# Patient Record
Sex: MALE | Race: BLACK OR AFRICAN AMERICAN | Employment: FULL TIME | URBAN - METROPOLITAN AREA
[De-identification: names, ages, dates, MRNs, and addresses within clinical notes are randomized per-mention and may not be internally consistent; named-entity substitution may affect disease eponyms.]

---

## 2018-06-07 ENCOUNTER — HOSPITAL ENCOUNTER (OUTPATIENT)
Dept: PHYSICAL THERAPY | Age: 53
Discharge: HOME | End: 2018-06-07
Payer: COMMERCIAL

## 2018-06-07 PROCEDURE — 97110 THERAPEUTIC EXERCISES: CPT

## 2018-06-07 PROCEDURE — 97162 PT EVAL MOD COMPLEX 30 MIN: CPT

## 2018-06-07 NOTE — PROGRESS NOTES
Ambulatory/Rehab Services H2 Model Falls Risk Assessment    Risk Factor Pts. ·   Confusion/Disorientation/Impulsivity  []    4 ·   Symptomatic Depression  []   2 ·   Altered Elimination  []   1 ·   Dizziness/Vertigo  []   1 ·   Gender (Male)  [x]   1 ·   Any administered antiepileptics (anticonvulsants):  []   2 ·   Any administered benzodiazepines:  []   1 ·   Visual Impairment (specify):  []   1 ·   Portable Oxygen Use  []   1 ·   Orthostatic ? BP  []   1 ·   History of Recent Falls (within 3 mos.)  []   5     Ability to Rise from Chair (choose one) Pts. ·   Ability to rise in a single movement  []   0 ·   Pushes up, successful in one attempt  []   1 ·   Multiple attempts, but successful  []   3 ·   Unable to rise without assistance  []   4   Total: (5 or greater = High Risk) 1     Falls Prevention Plan:   []                Physical Limitations to Exercise (specify):   []                Mobility Assistance Device (type):   []                Exercise/Equipment Adaptation (specify):    ©2010 [facility] [employer] [address] [place] [address] Patent #[id number].  Federal Law prohibits the replication, distribution or use without written permission from [facility] [employer]

## 2018-06-07 NOTE — THERAPY EVALUATION
Salvador Narvaez  : [id number] [facility],[address]  [address] [place] [address]  [phone]   [phone]        OUTPATIENT PHYSICAL THERAPY:Initial Assessment 2018         ICD-10: Treatment Diagnosis: Cervicalgia (M54.2)  Precautions/Allergies:   Codeine   Fall Risk Score: 1 (? 5 = High Risk)  MD Orders: eval and treat muscle spasms MEDICAL/REFERRING DIAGNOSIS:  Neck pain [M54.2]   DATE OF ONSET: 2 years ago  REFERRING PHYSICIAN: Simon Castrejon, NP  RETURN PHYSICIAN APPOINTMENT: unknown     INITIAL ASSESSMENT:  Mr. Ale Almazan presents with stiffness in the neck with over-recruitment of the cervical muscles causing pian, loss of motion, sleep disturbances. He is a good candidate for skilled PT to address these issues and allow pt to resume normal ADL's without pain limiting him. PROBLEM LIST (Impacting functional limitations):  1. Decreased ADL/Functional Activities  2. Increased Pain  3. Decreased Activity Tolerance  4. Decreased Flexibility/Joint Mobility  5. Decreased [place] with Home Exercise Program INTERVENTIONS PLANNED:  1. Home Exercise Program (HEP)  2. Manual Therapy  3. Therapeutic Exercise/Strengthening    TREATMENT PLAN:  Effective Dates: 2018 TO 2018 (60 days). Frequency/Duration: 2 times a week for 60 Days  GOALS: (Goals have been discussed and agreed upon with patient.)  Short-Term Functional Goals: Time Frame: 2 weeks  1. Pt to demonstrate correct sitting posture without cues. 2. Pt to report compliance with HEP. Discharge Goals: Time Frame: 8 weeks  1. Pt to demonstrate 70 degrees cervical rotation for pain free driving. 2. Pt to report pain < 2 for restorative sleep patterns. 3. Pt to demonstrate ability to elevate UE's without engaging UT muscles to perform ADL tasks without pain.   Rehabilitation Potential For Stated Goals: Good  Regarding Daniel Gamino's therapy, I certify that the treatment plan above will be carried out by a therapist or under their direction. Thank you for this referral,  Ria Rodriguez, PT       Referring Physician Signature: Arlet Joyner NP              Date                      HISTORY:   History of Present Injury/Illness (Reason for Referral):  Pt waking up with stiffness in the neck and low back for a couple of years. He has tried new mattresses and pillows with little success. He denies radiating sxs. He gets momentary relief in a hot shower. He has neck and back pain throughout the day at work and even at rest.  Past Medical History/Comorbidities:   Mr. Barton  has no past medical history on file. Mr. Barton  has no past surgical history on file. 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 Date Last Reviewed:  6/7/2018   # of Personal Factors/Comorbidities that affect the Plan of Care: 1-2: MODERATE COMPLEXITY   EXAMINATION:   Observation/Orthostatic Postural Assessment:          FH, rounded and FS;  Holds shoulders elevated  Palpation:          Marked tightness UT, levator, scalenes, scap region. ROM:     AROM cervical:  Rotation right 0-65, left 0-62  Significant limitation in SB bilaterally. Flex, ext WNL. UE WNL but overuse of UT with scap elevation. Strength:     Weak scap stabilizers. UE's 5/5. Special Tests:          unremarkable  Neurological Screen:        unremarkable  Functional Mobility:         Limited cervical mobility with faulty UE movement patterns. Body Structures Involved:  1. Joints  2. Muscles Body Functions Affected:  1. Sensory/Pain  2. Neuromusculoskeletal  3. Movement Related Activities and Participation Affected:  1. General Tasks and Demands  2. Mobility  3. Self Care  4. Domestic Life  5.  Community, Social and [place]   # of elements that affect the Plan of Care: 3: MODERATE COMPLEXITY   CLINICAL PRESENTATION:   Presentation: Evolving clinical presentation with changing clinical characteristics: MODERATE COMPLEXITY   CLINICAL DECISION MAKING:   Outcome Measure: Tool Used: Neck Disability Index (NDI)  Score:  Initial: 6/50  Most Recent: X/50 (Date: -- )   Interpretation of Score: The Neck Disability Index is a revised form of the Oswestry Low Back Pain Index and is designed to measure the activities of daily living in person's with neck pain. Each section is scored on a 0-5 scale, 5 representing the greatest disability. The scores of each section are added together for a total score of 50. Score 0 1-10 11-20 21-30 31-40 41-49 50   Modifier CH CI CJ CK CL CM CN         Medical Necessity:   · Patient demonstrates good rehab potential due to higher previous functional level. Reason for Services/Other Comments:  · Patient continues to require present interventions due to patient's inability to sleep, rama ADL's without pain. Use of outcome tool(s) and clinical judgement create a POC that gives a: Questionable prediction of patient's progress: MODERATE COMPLEXITY   TREATMENT:   (In addition to Assessment/Re-Assessment sessions the following treatments were rendered)  THERAPEUTIC EXERCISE: (see below for minutes):  Exercises per grid below to improve mobility and strength. Required moderate verbal and manual cues to promote proper body alignment, promote proper body posture, promote proper body mechanics and promote proper body breathing techniques. Progressed range, repetitions and complexity of movement as indicated. MANUAL THERAPY: (seee below for minutes): Soft tissue mobilization was utilized and necessary because of the patient's painful spasm and restricted motion of soft tissue.    MODALITIES: (see below for minutes):      for pain modulation     Date: 6/7/18       Modalities:                                Therapeutic Exercise: 28 mins UT stretch 3 mins       Levator stretch 3 mins       Chin tuck 3 mins       scap retractions 3 mins       Cervical rotation 3 mins       Diaphragmatic breathing 3 mins       Pt education, postural education, HEP 10 mins               Proprioceptive Activities:                                Manual Therapy:                        Therapeutic Activities:                                        HEP: see hand out  [employer] Portal  Treatment/Session Assessment:  Pt had good understanding of posture and need to control over-recruitment of UT/scap elevation. He was already applying concepts during session. · Pain/ Symptoms: Initial:   0/10 just stiffness this morning Post Session:  0/10 pt reported feeling much looser ·   Compliance with Program/Exercises: Will assess as treatment progresses. · Recommendations/Intent for next treatment session: \"Next visit will focus on advancements to more challenging activities\".   Total Treatment Duration:  PT Patient Time In/Time Out  Time In: 0800  Time Out: 0845     Mili Aguilar, PT

## 2018-06-14 ENCOUNTER — APPOINTMENT (OUTPATIENT)
Dept: PHYSICAL THERAPY | Age: 53
End: 2018-06-14
Payer: COMMERCIAL

## 2018-06-18 ENCOUNTER — APPOINTMENT (OUTPATIENT)
Dept: PHYSICAL THERAPY | Age: 53
End: 2018-06-18
Payer: COMMERCIAL

## 2018-06-20 ENCOUNTER — HOSPITAL ENCOUNTER (OUTPATIENT)
Dept: PHYSICAL THERAPY | Age: 53
Discharge: HOME | End: 2018-06-20
Payer: COMMERCIAL

## 2018-06-20 PROCEDURE — 97110 THERAPEUTIC EXERCISES: CPT

## 2018-06-20 PROCEDURE — 97140 MANUAL THERAPY 1/> REGIONS: CPT

## 2018-06-20 NOTE — PROGRESS NOTES
Eamon Ray  : 1965 [id number] [facility],[address]  [address] [place] [address].  [phone]   [phone]        OUTPATIENT PHYSICAL THERAPY:Daily Note 2018         ICD-10: Treatment Diagnosis: Cervicalgia (M54.2)  Precautions/Allergies:   Codeine   Fall Risk Score: 1 (? 5 = High Risk)  MD Orders: eval and treat muscle spasms MEDICAL/REFERRING DIAGNOSIS:  Neck pain [M54.2]   DATE OF ONSET: 2 years ago  REFERRING PHYSICIAN: Ladan Castrejon NP  RETURN PHYSICIAN APPOINTMENT: unknown     INITIAL ASSESSMENT:  Mr. La Nena Aguero presents with stiffness in the neck with over-recruitment of the cervical muscles causing pian, loss of motion, sleep disturbances. He is a good candidate for skilled PT to address these issues and allow pt to resume normal ADL's without pain limiting him. PROBLEM LIST (Impacting functional limitations):  1. Decreased ADL/Functional Activities  2. Increased Pain  3. Decreased Activity Tolerance  4. Decreased Flexibility/Joint Mobility  5. Decreased [place] with Home Exercise Program INTERVENTIONS PLANNED:  1. Home Exercise Program (HEP)  2. Manual Therapy  3. Therapeutic Exercise/Strengthening    TREATMENT PLAN:  Effective Dates: 2018 TO 2018 (60 days). Frequency/Duration: 2 times a week for 60 Days  GOALS: (Goals have been discussed and agreed upon with patient.)  Short-Term Functional Goals: Time Frame: 2 weeks  1. Pt to demonstrate correct sitting posture without cues. 2. Pt to report compliance with HEP. Discharge Goals: Time Frame: 8 weeks  1. Pt to demonstrate 70 degrees cervical rotation for pain free driving. 2. Pt to report pain < 2 for restorative sleep patterns. 3. Pt to demonstrate ability to elevate UE's without engaging UT muscles to perform ADL tasks without pain.                 HISTORY:   History of Present Injury/Illness (Reason for Referral):  Pt waking up with stiffness in the neck and low back for a couple of years. He has tried new mattresses and pillows with little success. He denies radiating sxs. He gets momentary relief in a hot shower. He has neck and back pain throughout the day at work and even at rest.  Past Medical History/Comorbidities:   Mr. Timoteo Ray  has no past medical history on file. Mr. Timoteo Ray  has no past surgical history on file. 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 Date Last Reviewed:  6/20/2018   EXAMINATION:   Observation/Orthostatic Postural Assessment:          FH, rounded and FS;  Holds shoulders elevated  Palpation:          Marked tightness UT, levator, scalenes, scap region. ROM:     AROM cervical: 6/20/18  Rotation right 0-72, left 0-62  Significant limitation in SB bilaterally. Flex, ext WNL. UE WNL but overuse of UT with scap elevation. Strength:     Weak scap stabilizers. UE's 5/5. Special Tests:          unremarkable  Neurological Screen:        unremarkable  Functional Mobility:         Limited cervical mobility with faulty UE movement patterns. Body Structures Involved:  1. Joints  2. Muscles Body Functions Affected:  1. Sensory/Pain  2. Neuromusculoskeletal  3. Movement Related Activities and Participation Affected:  1. General Tasks and Demands  2. Mobility  3. Self Care  4. Domestic Life  5. Community, Social and Civic Life   CLINICAL PRESENTATION:   CLINICAL DECISION MAKING:   Outcome Measure: Tool Used: Neck Disability Index (NDI)  Score:  Initial: 6/50  Most Recent: X/50 (Date: -- )   Interpretation of Score: The Neck Disability Index is a revised form of the Oswestry Low Back Pain Index and is designed to measure the activities of daily living in person's with neck pain. Each section is scored on a 0-5 scale, 5 representing the greatest disability. The scores of each section are added together for a total score of 50. Score 0 1-10 11-20 21-30 31-40 41-49 50   Modifier CH CI CJ CK CL CM CN         Medical Necessity:   · Patient demonstrates good rehab potential due to higher previous functional level. Reason for Services/Other Comments:  · Patient continues to require present interventions due to patient's inability to sleep, rama ADL's without pain. TREATMENT:   (In addition to Assessment/Re-Assessment sessions the following treatments were rendered)  THERAPEUTIC EXERCISE: (see below for minutes):  Exercises per grid below to improve mobility and strength. Required moderate verbal and manual cues to promote proper body alignment, promote proper body posture, promote proper body mechanics and promote proper body breathing techniques. Progressed range, repetitions and complexity of movement as indicated. MANUAL THERAPY: (seee below for minutes): Soft tissue mobilization was utilized and necessary because of the patient's painful spasm and restricted motion of soft tissue. MODALITIES: (see below for minutes):      for pain modulation     Date: 6/7/18 6/20/18  Visit 2      Modalities:                                Therapeutic Exercise: 28 mins 25 mins      UT stretch 3 mins 6 mins      Levator stretch 3 mins 6 mins      Chin tuck 3 mins 2 mins      scap retractions 3 mins 30 reps      Cervical rotation 3 mins 6 mins      Diaphragmatic breathing 3 mins       Pt education, postural education, HEP 10 mins 3 mins              Proprioceptive Activities:                                Manual Therapy:  20 mins      STM cervical  15 mins      Mobs for rotation/ext  5 mins      Therapeutic Activities:                                        HEP: see hand out  [employer] Portal  Treatment/Session Assessment: Good compliance. Improved mobility noted.      · Pain/ Symptoms: Initial:   1/10 just some stiffness but so much better than last time Post Session:  0/10 ·   Compliance with Program/Exercises: Will assess as treatment progresses. · Recommendations/Intent for next treatment session: \"Next visit will focus on advancements to more challenging activities\".   Total Treatment Duration:  PT Patient Time In/Time Out  Time In: 0800  Time Out: 0845     Jerry Aguilar, PT

## 2018-06-22 ENCOUNTER — HOSPITAL ENCOUNTER (OUTPATIENT)
Dept: PHYSICAL THERAPY | Age: 53
Discharge: HOME | End: 2018-06-22
Payer: COMMERCIAL

## 2018-06-22 PROCEDURE — 97140 MANUAL THERAPY 1/> REGIONS: CPT

## 2018-06-22 PROCEDURE — 97110 THERAPEUTIC EXERCISES: CPT

## 2018-06-22 NOTE — PROGRESS NOTES
Jomar Castelan  : 1965 [id number] [facility],[address]  [address] [place] [address].  [phone]   [phone]        OUTPATIENT PHYSICAL THERAPY:Daily Note 2018         ICD-10: Treatment Diagnosis: Cervicalgia (M54.2)  Precautions/Allergies:   Codeine   Fall Risk Score: 1 (? 5 = High Risk)  MD Orders: eval and treat muscle spasms MEDICAL/REFERRING DIAGNOSIS:  Neck pain [M54.2]   DATE OF ONSET: 2 years ago  REFERRING PHYSICIAN: Britt Castrejon, ROGERS  RETURN PHYSICIAN APPOINTMENT: unknown     INITIAL ASSESSMENT:  Mr. Russell Session presents with stiffness in the neck with over-recruitment of the cervical muscles causing pian, loss of motion, sleep disturbances. He is a good candidate for skilled PT to address these issues and allow pt to resume normal ADL's without pain limiting him. PROBLEM LIST (Impacting functional limitations):  1. Decreased ADL/Functional Activities  2. Increased Pain  3. Decreased Activity Tolerance  4. Decreased Flexibility/Joint Mobility  5. Decreased [place] with Home Exercise Program INTERVENTIONS PLANNED:  1. Home Exercise Program (HEP)  2. Manual Therapy  3. Therapeutic Exercise/Strengthening    TREATMENT PLAN:  Effective Dates: 2018 TO 2018 (60 days). Frequency/Duration: 2 times a week for 60 Days  GOALS: (Goals have been discussed and agreed upon with patient.)  Short-Term Functional Goals: Time Frame: 2 weeks  1. Pt to demonstrate correct sitting posture without cues. 2. Pt to report compliance with HEP. Discharge Goals: Time Frame: 8 weeks  1. Pt to demonstrate 70 degrees cervical rotation for pain free driving. 2. Pt to report pain < 2 for restorative sleep patterns. 3. Pt to demonstrate ability to elevate UE's without engaging UT muscles to perform ADL tasks without pain.                 HISTORY:   History of Present Injury/Illness (Reason for Referral):  Pt waking up with stiffness in the neck and low back for a couple of years. He has tried new mattresses and pillows with little success. He denies radiating sxs. He gets momentary relief in a hot shower. He has neck and back pain throughout the day at work and even at rest.  Past Medical History/Comorbidities:   Mr. Suresh Dickson  has no past medical history on file. Mr. Suresh Dickson  has no past surgical history on file. 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 Date Last Reviewed:  6/22/2018   EXAMINATION:   Observation/Orthostatic Postural Assessment:          FH, rounded and FS;  Holds shoulders elevated  Palpation:          Marked tightness UT, levator, scalenes, scap region. ROM:     AROM cervical: 6/22/18  Rotation right 0-72, left 0-55  Significant limitation in SB bilaterally. Flex, ext WNL. UE WNL but overuse of UT with scap elevation. Strength:     Weak scap stabilizers. UE's 5/5. Special Tests:          unremarkable  Neurological Screen:        unremarkable  Functional Mobility:         Limited cervical mobility with faulty UE movement patterns. Body Structures Involved:  1. Joints  2. Muscles Body Functions Affected:  1. Sensory/Pain  2. Neuromusculoskeletal  3. Movement Related Activities and Participation Affected:  1. General Tasks and Demands  2. Mobility  3. Self Care  4. Domestic Life  5. Community, Social and Civic Life   CLINICAL PRESENTATION:   CLINICAL DECISION MAKING:   Outcome Measure: Tool Used: Neck Disability Index (NDI)  Score:  Initial: 6/50  Most Recent: X/50 (Date: -- )   Interpretation of Score: The Neck Disability Index is a revised form of the Oswestry Low Back Pain Index and is designed to measure the activities of daily living in person's with neck pain. Each section is scored on a 0-5 scale, 5 representing the greatest disability. The scores of each section are added together for a total score of 50. Score 0 1-10 11-20 21-30 31-40 41-49 50   Modifier CH CI CJ CK CL CM CN         Medical Necessity:   · Patient demonstrates good rehab potential due to higher previous functional level. Reason for Services/Other Comments:  · Patient continues to require present interventions due to patient's inability to sleep, rama ADL's without pain. TREATMENT:   (In addition to Assessment/Re-Assessment sessions the following treatments were rendered)  THERAPEUTIC EXERCISE: (see below for minutes):  Exercises per grid below to improve mobility and strength. Required moderate verbal and manual cues to promote proper body alignment, promote proper body posture, promote proper body mechanics and promote proper body breathing techniques. Progressed range, repetitions and complexity of movement as indicated. MANUAL THERAPY: (seee below for minutes): Soft tissue mobilization was utilized and necessary because of the patient's painful spasm and restricted motion of soft tissue. MODALITIES: (see below for minutes):      for pain modulation     Date: 6/7/18 6/20/18  Visit 2 6/22/18  Visit 3     Modalities:                                Therapeutic Exercise: 28 mins 25 mins 25 mins     UT stretch 3 mins 6 mins 6 mins     Levator stretch 3 mins 6 mins 6 mins     Chin tuck 3 mins 2 mins 30 reps     scap retractions 3 mins 30 reps 30 reps     Cervical rotation 3 mins 6 mins 6 mins     Diaphragmatic breathing 3 mins       Pt education, postural education, HEP 10 mins 3 mins              Proprioceptive Activities:                                Manual Therapy:  20 mins 20 mins     STM cervical  15 mins 15 mins     Mobs for rotation/ext  5 mins 5 mins     Therapeutic Activities:                                        HEP: see hand out  [employer] Portal  Treatment/Session Assessment:     · Pain/ Symptoms: Initial:   0/10  I am feeling great in my neck.   No stiffness or pain. Now my back is really getting my attention. The nurse is supposed to send an order so you work on it. Post Session:  0/10 ·   Compliance with Program/Exercises: Will assess as treatment progresses. · Recommendations/Intent for next treatment session: \"Next visit will focus on advancements to more challenging activities\".   Total Treatment Duration:  PT Patient Time In/Time Out  Time In: 0415  Time Out: 0500     Anuel Aguilar, PT

## 2018-06-25 ENCOUNTER — APPOINTMENT (OUTPATIENT)
Dept: PHYSICAL THERAPY | Age: 53
End: 2018-06-25
Payer: COMMERCIAL

## 2018-06-27 ENCOUNTER — HOSPITAL ENCOUNTER (OUTPATIENT)
Dept: PHYSICAL THERAPY | Age: 53
Discharge: HOME | End: 2018-06-27
Payer: COMMERCIAL

## 2018-06-27 PROCEDURE — 97140 MANUAL THERAPY 1/> REGIONS: CPT

## 2018-06-27 PROCEDURE — 97110 THERAPEUTIC EXERCISES: CPT

## 2018-06-27 NOTE — THERAPY RECERTIFICATION
Pipo Schmidt  : 1965 [address] [facility],[address]  [address] [place] [address]  [phone]   [phone]        OUTPATIENT PHYSICAL THERAPY:Daily Note and Recertification          ICD-10: Treatment Diagnosis: Cervicalgia (M54.2), Low back pain (M54.5)  Precautions/Allergies:   Codeine   Fall Risk Score: 1 (? 5 = High Risk)  MD Orders: eval and treat muscle spasms MEDICAL/REFERRING DIAGNOSIS:  Neck pain [M54.2]   DATE OF ONSET: 2 years ago  REFERRING PHYSICIAN: Lalo Castrejon, ROGERS  RETURN PHYSICIAN APPOINTMENT: unknown     INITIAL ASSESSMENT:  Mr. Fernando Hernandez presents with stiffness in the neck with over-recruitment of the cervical muscles causing pian, loss of motion, sleep disturbances. He also reports stiffness in the low back that hinders overall function and sleep quality. He is a good candidate for skilled PT to address these issues and allow pt to resume normal ADL's without pain limiting him. PROBLEM LIST (Impacting functional limitations):  1. Decreased ADL/Functional Activities  2. Increased Pain  3. Decreased Activity Tolerance  4. Decreased Flexibility/Joint Mobility  5. Decreased [place] with Home Exercise Program INTERVENTIONS PLANNED:  1. Home Exercise Program (HEP)  2. Manual Therapy  3. Therapeutic Exercise/Strengthening    TREATMENT PLAN:  Effective Dates: 2018 TO 2018 (60 days). Frequency/Duration: 2 times a week for 60 Days  GOALS: (Goals have been discussed and agreed upon with patient.)  Short-Term Functional Goals: Time Frame: 2 weeks  1. Pt to demonstrate correct sitting posture without cues. - MET  2. Pt to report compliance with HEP. - MET  Discharge Goals: Time Frame: 8 weeks  1. Pt to demonstrate 70 degrees cervical rotation for pain free driving. - MET  2. Pt to report pain < 2 for restorative sleep patterns. - ongoing  3.  Pt to demonstrate ability to elevate UE's without engaging UT muscles to perform ADL tasks without pain. - ongoing    Regarding Ana Gamino's therapy, I certify that the treatment plan above will be carried out by a therapist or under their direction. Thank you for this referral,  Sandra Miller, PT     Referring Physician Signature: Edinson Christianson, NP          Date                          HISTORY:   History of Present Injury/Illness (Reason for Referral):  Pt waking up with stiffness in the neck and low back for a couple of years. He has tried new mattresses and pillows with little success. He denies radiating sxs. He gets momentary relief in a hot shower. He has neck and back pain throughout the day at work and even at rest.  Past Medical History/Comorbidities:   Mr. Slim Isaacs  has no past medical history on file. Mr. Slim Isaacs  has no past surgical history on file. 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 Date Last Reviewed:  6/27/2018   EXAMINATION:   Observation/Orthostatic Postural Assessment:          FH, rounded and FS;  symm pelvic landmarks, rigid thoracolumbar region  Palpation:          Marked tightness UT, levator, scalenes, scap region, paraspinals left > right. ROM:     AROM cervical: 6/27/18  Rotation right 0-77, left 0-70  SB, flex, ext WNL. Trunk range WNL but guarded through all ranges. Hams and piriformis tightness. Strength:     Weak scap stabilizers. UE's 5/5. 5/5 LE's but unable to engage gluts without paraspinals and hams. Special Tests:          unremarkable  Neurological Screen:        unremarkable  Functional Mobility:         Limited cervical mobility with faulty UE movement patterns. Overuse of paraspinals and hams with lifting, pushing, pulling with work tasks. Body Structures Involved:  1. Joints  2.  Muscles Body Functions Affected:  1. Sensory/Pain  2. Neuromusculoskeletal  3. Movement Related Activities and Participation Affected:  1. General Tasks and Demands  2. Mobility  3. Self Care  4. Domestic Life  5. Community, Social and Civic Life   CLINICAL PRESENTATION:   CLINICAL DECISION MAKING:   Outcome Measure: Tool Used: Neck Disability Index (NDI)  Score:  Initial: 6/50  Most Recent: X/50 (Date: -- )   Interpretation of Score: The Neck Disability Index is a revised form of the Oswestry Low Back Pain Index and is designed to measure the activities of daily living in person's with neck pain. Each section is scored on a 0-5 scale, 5 representing the greatest disability. The scores of each section are added together for a total score of 50. Score 0 1-10 11-20 21-30 31-40 41-49 50   Modifier CH CI CJ CK CL CM CN         Medical Necessity:   · Patient demonstrates good rehab potential due to higher previous functional level. Reason for Services/Other Comments:  · Patient continues to require present interventions due to patient's inability to sleep, rama ADL's without pain. TREATMENT:   (In addition to Assessment/Re-Assessment sessions the following treatments were rendered)  THERAPEUTIC EXERCISE: (see below for minutes):  Exercises per grid below to improve mobility and strength. Required moderate verbal and manual cues to promote proper body alignment, promote proper body posture, promote proper body mechanics and promote proper body breathing techniques. Progressed range, repetitions and complexity of movement as indicated. MANUAL THERAPY: (seee below for minutes): Soft tissue mobilization was utilized and necessary because of the patient's painful spasm and restricted motion of soft tissue.    MODALITIES: (see below for minutes):      for pain modulation     Date: 6/7/18 6/20/18  Visit 2 6/22/18  Visit 3 5/61/12  Visit 4  Re-cert for LBP       Modalities:                                            Therapeutic Exercise: 28 mins 25 mins 25 mins 30 mins       UT stretch 3 mins 6 mins 6 mins 4 mins       Levator stretch 3 mins 6 mins 6 mins 4 mins       Chin tuck 3 mins 2 mins 30 reps        scap retractions 3 mins 30 reps 30 reps        Cervical rotation 3 mins 6 mins 6 mins 4 mins       Diaphragmatic breathing 3 mins          Pt education, postural education, HEP 10 mins 3 mins  5 mins       LTR    3 mins       Hams stretching    4 mins       Piriformis stretching    5 mins seated, supine       Glut sets without paraspinals, hams    3 mins       Bridging without paraspinals, hams    3 mins                  Proprioceptive Activities:                                            Manual Therapy:  20 mins 20 mins 25 mins       STM cervical - lumbar  15 mins 15 mins 25 mins       Mobs for rotation/ext  5 mins 5 mins        Therapeutic Activities:                                                       HEP: see hand out  [employer] Portal  Treatment/Session Assessment: pt has made very good progress with cervical motion and pain control by learning to relax and utilizing better muscle recruitment patterns. He should do equally as well with the lumbar issues as he learns stretching, relaxing and improved recruitment with lifting mechanics for work. Left UE sxs relieved with relaxation of left UT.     · Pain/ Symptoms: Initial:   0/10  No pain just some stiffness in the left back more than the right. My left arm gets tingly at various times. Post Session:  0/10 ·   Compliance with Program/Exercises: Will assess as treatment progresses. · Recommendations/Intent for next treatment session: \"Next visit will focus on advancements to more challenging activities\".   Total Treatment Duration:  PT Patient Time In/Time Out  Time In: 0800  Time Out: 0900     Yue Aguilar, PT

## 2018-06-28 ENCOUNTER — APPOINTMENT (OUTPATIENT)
Dept: PHYSICAL THERAPY | Age: 53
End: 2018-06-28
Payer: COMMERCIAL

## 2018-06-29 ENCOUNTER — HOSPITAL ENCOUNTER (OUTPATIENT)
Dept: PHYSICAL THERAPY | Age: 53
Discharge: HOME | End: 2018-06-29
Payer: COMMERCIAL

## 2018-06-29 PROCEDURE — 97140 MANUAL THERAPY 1/> REGIONS: CPT

## 2018-06-29 PROCEDURE — 97110 THERAPEUTIC EXERCISES: CPT

## 2018-06-29 NOTE — PROGRESS NOTES
Lopez Aggarwal  : 1965 [id number] [facility],[address]  [address] [place] [address].  [phone]   [phone]        OUTPATIENT PHYSICAL THERAPY:Daily Note 2018         ICD-10: Treatment Diagnosis: Cervicalgia (M54.2), Low back pain (M54.5)  Precautions/Allergies:   Codeine   Fall Risk Score: 1 (? 5 = High Risk)  MD Orders: eval and treat muscle spasms MEDICAL/REFERRING DIAGNOSIS:  Neck pain [M54.2]   DATE OF ONSET: 2 years ago  REFERRING PHYSICIAN: Ewelina Castrejon, NP  RETURN PHYSICIAN APPOINTMENT: unknown     INITIAL ASSESSMENT:  Mr. Kwan Sethi presents with stiffness in the neck with over-recruitment of the cervical muscles causing pian, loss of motion, sleep disturbances. He also reports stiffness in the low back that hinders overall function and sleep quality. He is a good candidate for skilled PT to address these issues and allow pt to resume normal ADL's without pain limiting him. PROBLEM LIST (Impacting functional limitations):  1. Decreased ADL/Functional Activities  2. Increased Pain  3. Decreased Activity Tolerance  4. Decreased Flexibility/Joint Mobility  5. Decreased [place] with Home Exercise Program INTERVENTIONS PLANNED:  1. Home Exercise Program (HEP)  2. Manual Therapy  3. Therapeutic Exercise/Strengthening    TREATMENT PLAN:  Effective Dates: 2018 TO 2018 (60 days). Frequency/Duration: 2 times a week for 60 Days  GOALS: (Goals have been discussed and agreed upon with patient.)  Short-Term Functional Goals: Time Frame: 2 weeks  1. Pt to demonstrate correct sitting posture without cues. - MET  2. Pt to report compliance with HEP. - MET  Discharge Goals: Time Frame: 8 weeks  1. Pt to demonstrate 70 degrees cervical rotation for pain free driving. - MET  2. Pt to report pain < 2 for restorative sleep patterns. - ongoing  3. Pt to demonstrate ability to elevate UE's without engaging UT muscles to perform ADL tasks without pain.  - ongoing                HISTORY:   History of Present Injury/Illness (Reason for Referral):  Pt waking up with stiffness in the neck and low back for a couple of years. He has tried new mattresses and pillows with little success. He denies radiating sxs. He gets momentary relief in a hot shower. He has neck and back pain throughout the day at work and even at rest.  Past Medical History/Comorbidities:   Mr. Rudy Zelaya  has no past medical history on file. Mr. Rudy Zelaya  has no past surgical history on file. 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 Date Last Reviewed:  6/29/2018   EXAMINATION:   Observation/Orthostatic Postural Assessment:          FH, rounded and FS;  symm pelvic landmarks, rigid thoracolumbar region  Palpation:          Marked tightness UT, levator, scalenes, scap region, paraspinals left > right. ROM:     AROM cervical: 6/27/18  Rotation right 0-77, left 0-70  SB, flex, ext WNL. Trunk range WNL but guarded through all ranges. Hams and piriformis tightness. Strength:     Weak scap stabilizers. UE's 5/5. 5/5 LE's but unable to engage gluts without paraspinals and hams. Special Tests:          unremarkable  Neurological Screen:        unremarkable  Functional Mobility:         Limited cervical mobility with faulty UE movement patterns. Overuse of paraspinals and hams with lifting, pushing, pulling with work tasks. Body Structures Involved:  1. Joints  2. Muscles Body Functions Affected:  1. Sensory/Pain  2. Neuromusculoskeletal  3. Movement Related Activities and Participation Affected:  1. General Tasks and Demands  2. Mobility  3. Self Care  4. Domestic Life  5. Community, Social and Civic Life   CLINICAL PRESENTATION:   CLINICAL DECISION MAKING:   Outcome Measure:    Tool Used: Neck Disability Index (NDI)  Score:  Initial: 6/50  Most Recent: X/50 (Date: -- )   Interpretation of Score: The Neck Disability Index is a revised form of the Oswestry Low Back Pain Index and is designed to measure the activities of daily living in person's with neck pain. Each section is scored on a 0-5 scale, 5 representing the greatest disability. The scores of each section are added together for a total score of 50. Score 0 1-10 11-20 21-30 31-40 41-49 50   Modifier CH CI CJ CK CL CM CN         Medical Necessity:   · Patient demonstrates good rehab potential due to higher previous functional level. Reason for Services/Other Comments:  · Patient continues to require present interventions due to patient's inability to sleep, rama ADL's without pain. TREATMENT:   (In addition to Assessment/Re-Assessment sessions the following treatments were rendered)  THERAPEUTIC EXERCISE: (see below for minutes):  Exercises per grid below to improve mobility and strength. Required moderate verbal and manual cues to promote proper body alignment, promote proper body posture, promote proper body mechanics and promote proper body breathing techniques. Progressed range, repetitions and complexity of movement as indicated. MANUAL THERAPY: (seee below for minutes): Soft tissue mobilization was utilized and necessary because of the patient's painful spasm and restricted motion of soft tissue.    MODALITIES: (see below for minutes):      for pain modulation     Date: 6/7/18 6/20/18  Visit 2 6/22/18  Visit 3 8/12/95  Visit 4  Re-cert for LBP 5/94/02  Visit 5      Modalities:                                            Therapeutic Exercise: 28 mins 25 mins 25 mins 30 mins 35 mins      UT stretch 3 mins 6 mins 6 mins 4 mins 4 mins      Levator stretch 3 mins 6 mins 6 mins 4 mins 4 mins      Chin tuck 3 mins 2 mins 30 reps  30 reps      scap retractions 3 mins 30 reps 30 reps        Cervical rotation 3 mins 6 mins 6 mins 4 mins Diaphragmatic breathing 3 mins          Pt education, postural education, HEP 10 mins 3 mins  5 mins       LTR    3 mins 3 mins      Hams stretching    4 mins 4 mins      Piriformis stretching    5 mins seated, supine 6 mins seated      Glut sets without paraspinals, hams    3 mins       Bridging without paraspinals, hams    3 mins 30 reps      S/L hip abd     30 reps      Clam shells     30 reps                            Proprioceptive Activities:                                            Manual Therapy:  20 mins 20 mins 25 mins 10 mins      STM cervical - lumbar  15 mins 15 mins 25 mins 10 mins      Mobs for rotation/ext  5 mins 5 mins        Therapeutic Activities:                                                       HEP: see hand out  [employer] Portal  Treatment/Session Assessment: Responding well with less soreness and stiffness. · Pain/ Symptoms: Initial:   0/10 feeling less stiff in neck and back - it is very nice relief Post Session:  No pain ·   Compliance with Program/Exercises: Will assess as treatment progresses. · Recommendations/Intent for next treatment session: \"Next visit will focus on advancements to more challenging activities\".   Total Treatment Duration:  PT Patient Time In/Time Out  Time In: 1100  Time Out: Marlon Pinto PT

## 2018-07-02 ENCOUNTER — APPOINTMENT (OUTPATIENT)
Dept: PHYSICAL THERAPY | Age: 53
End: 2018-07-02
Payer: COMMERCIAL

## 2018-07-03 ENCOUNTER — HOSPITAL ENCOUNTER (OUTPATIENT)
Dept: PHYSICAL THERAPY | Age: 53
Discharge: HOME | End: 2018-07-03
Payer: COMMERCIAL

## 2018-07-03 PROCEDURE — 97110 THERAPEUTIC EXERCISES: CPT

## 2018-07-03 NOTE — PROGRESS NOTES
Luigi White  : 1965 [address] [facility],[address]  [address] [place] [address]  [phone]   [phone]        OUTPATIENT PHYSICAL THERAPY:Daily Note 7/3/2018         ICD-10: Treatment Diagnosis: Cervicalgia (M54.2), Low back pain (M54.5)  Precautions/Allergies:   Codeine   Fall Risk Score: 1 (? 5 = High Risk)  MD Orders: eval and treat muscle spasms MEDICAL/REFERRING DIAGNOSIS:  Neck pain [M54.2]   DATE OF ONSET: 2 years ago  REFERRING PHYSICIAN: Tova Castrejon, ROGERS  RETURN PHYSICIAN APPOINTMENT: unknown     INITIAL ASSESSMENT:  Mr. Anastasia Moreno presents with stiffness in the neck with over-recruitment of the cervical muscles causing pian, loss of motion, sleep disturbances. He also reports stiffness in the low back that hinders overall function and sleep quality. He is a good candidate for skilled PT to address these issues and allow pt to resume normal ADL's without pain limiting him. PROBLEM LIST (Impacting functional limitations):  1. Decreased ADL/Functional Activities  2. Increased Pain  3. Decreased Activity Tolerance  4. Decreased Flexibility/Joint Mobility  5. Decreased [place] with Home Exercise Program INTERVENTIONS PLANNED:  1. Home Exercise Program (HEP)  2. Manual Therapy  3. Therapeutic Exercise/Strengthening    TREATMENT PLAN:  Effective Dates: 2018 TO 2018 (60 days). Frequency/Duration: 2 times a week for 60 Days  GOALS: (Goals have been discussed and agreed upon with patient.)  Short-Term Functional Goals: Time Frame: 2 weeks  1. Pt to demonstrate correct sitting posture without cues. - MET  2. Pt to report compliance with HEP. - MET  Discharge Goals: Time Frame: 8 weeks  1. Pt to demonstrate 70 degrees cervical rotation for pain free driving. - MET  2. Pt to report pain < 2 for restorative sleep patterns. - ongoing  3. Pt to demonstrate ability to elevate UE's without engaging UT muscles to perform ADL tasks without pain.  - ongoing                HISTORY:   History of Present Injury/Illness (Reason for Referral):  Pt waking up with stiffness in the neck and low back for a couple of years. He has tried new mattresses and pillows with little success. He denies radiating sxs. He gets momentary relief in a hot shower. He has neck and back pain throughout the day at work and even at rest.  Past Medical History/Comorbidities:   Mr. Carri Rose  has no past medical history on file. Mr. Carri Rose  has no past surgical history on file. 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 Date Last Reviewed:  7/3/2018   EXAMINATION:   Observation/Orthostatic Postural Assessment:          FH, rounded and FS;  symm pelvic landmarks, rigid thoracolumbar region  Palpation:          Marked tightness UT, levator, scalenes, scap region, paraspinals left > right. ROM:     AROM cervical: 6/27/18  Rotation right 0-77, left 0-70  SB, flex, ext WNL. Trunk range WNL but guarded through all ranges. Hams and piriformis tightness. Strength:     Weak scap stabilizers. UE's 5/5. 5/5 LE's but unable to engage gluts without paraspinals and hams. Special Tests:          unremarkable  Neurological Screen:        unremarkable  Functional Mobility:         Limited cervical mobility with faulty UE movement patterns. Overuse of paraspinals and hams with lifting, pushing, pulling with work tasks. Body Structures Involved:  1. Joints  2. Muscles Body Functions Affected:  1. Sensory/Pain  2. Neuromusculoskeletal  3. Movement Related Activities and Participation Affected:  1. General Tasks and Demands  2. Mobility  3. Self Care  4. Domestic Life  5. Community, Social and Civic Life   CLINICAL PRESENTATION:   CLINICAL DECISION MAKING:   Outcome Measure:    Tool Used: Neck Disability Index (NDI)  Score:  Initial: 6/50  Most Recent: X/50 (Date: -- )   Interpretation of Score: The Neck Disability Index is a revised form of the Oswestry Low Back Pain Index and is designed to measure the activities of daily living in person's with neck pain. Each section is scored on a 0-5 scale, 5 representing the greatest disability. The scores of each section are added together for a total score of 50. Score 0 1-10 11-20 21-30 31-40 41-49 50   Modifier CH CI CJ CK CL CM CN         Medical Necessity:   · Patient demonstrates good rehab potential due to higher previous functional level. Reason for Services/Other Comments:  · Patient continues to require present interventions due to patient's inability to sleep, rama ADL's without pain. TREATMENT:   (In addition to Assessment/Re-Assessment sessions the following treatments were rendered)  THERAPEUTIC EXERCISE: (see below for minutes):  Exercises per grid below to improve mobility and strength. Required moderate verbal and manual cues to promote proper body alignment, promote proper body posture, promote proper body mechanics and promote proper body breathing techniques. Progressed range, repetitions and complexity of movement as indicated. MANUAL THERAPY: (seee below for minutes): Soft tissue mobilization was utilized and necessary because of the patient's painful spasm and restricted motion of soft tissue.    MODALITIES: (see below for minutes):      for pain modulation     Date: 6/7/18 6/20/18  Visit 2 6/22/18  Visit 3 6/90/86  Visit 4  Re-cert for LBP 5/46/95  Visit 5 7/3/18  Visit 6     Modalities:                                            Therapeutic Exercise: 28 mins 25 mins 25 mins 30 mins 35 mins 55 mins     UT stretch 3 mins 6 mins 6 mins 4 mins 4 mins 4 mins     Levator stretch 3 mins 6 mins 6 mins 4 mins 4 mins 4 mins     Chin tuck 3 mins 2 mins 30 reps  30 reps 30 reps     scap retractions 3 mins 30 reps 30 reps        Cervical rotation 3 mins 6 mins 6 mins 4 mins       Diaphragmatic breathing 3 mins          Pt education, postural education, HEP 10 mins 3 mins  5 mins       LTR    3 mins 3 mins      Hams stretching    4 mins 4 mins 6 mins supine     Piriformis stretching    5 mins seated, supine 6 mins seated 6 mins supine, seated     Glut sets without paraspinals, hams    3 mins       Bridging without paraspinals, hams    3 mins 30 reps 30 reps     S/L hip abd keeping back relaxed     30 reps 30 reps     Clam shells     30 reps 30 reps     H/k pelvic tilts      3 mins     H/k rocking back      3 mins                Proprioceptive Activities:                                            Manual Therapy:  20 mins 20 mins 25 mins 10 mins      STM cervical - lumbar  15 mins 15 mins 25 mins 10 mins      Mobs for rotation/ext  5 mins 5 mins        Therapeutic Activities:                                            HEP: see hand out  [employer] Portal  Treatment/Session Assessment:  Compliant with all suggestions, HEP. Left hip remains most restricted but good overall improvement in mobility, flexibility, relaxation and sxs relief. · Pain/ Symptoms: Initial:   No pain and not even really stiffness except in left side of back and hip Post Session:  No pain, good flexibility after stretching ·   Compliance with Program/Exercises: Will assess as treatment progresses. · Recommendations/Intent for next treatment session: \"Next visit will focus on advancements to more challenging activities\".   Total Treatment Duration:  PT Patient Time In/Time Out  Time In: 0800  Time Out: 0855     Jeanette Aguilar, PT

## 2018-07-05 ENCOUNTER — APPOINTMENT (OUTPATIENT)
Dept: PHYSICAL THERAPY | Age: 53
End: 2018-07-05
Payer: COMMERCIAL

## 2018-07-06 ENCOUNTER — APPOINTMENT (OUTPATIENT)
Dept: PHYSICAL THERAPY | Age: 53
End: 2018-07-06
Payer: COMMERCIAL

## 2018-07-06 ENCOUNTER — HOSPITAL ENCOUNTER (OUTPATIENT)
Dept: PHYSICAL THERAPY | Age: 53
Discharge: HOME | End: 2018-07-06
Payer: COMMERCIAL

## 2018-07-06 PROCEDURE — 97110 THERAPEUTIC EXERCISES: CPT

## 2018-07-06 NOTE — PROGRESS NOTES
Claudene Cristopher  : 1965 [id number] [facility],[address]  [address] [place] [address]  [phone]   [phone]        OUTPATIENT PHYSICAL THERAPY:Daily Note 2018         ICD-10: Treatment Diagnosis: Cervicalgia (M54.2), Low back pain (M54.5)  Precautions/Allergies:   Codeine   Fall Risk Score: 1 (? 5 = High Risk)  MD Orders: eval and treat muscle spasms MEDICAL/REFERRING DIAGNOSIS:  Neck pain [M54.2]   DATE OF ONSET: 2 years ago  REFERRING PHYSICIAN: Mary Ellen Castrejon, ROGERS  RETURN PHYSICIAN APPOINTMENT: unknown     INITIAL ASSESSMENT:  Mr. Kelli Eagle presents with stiffness in the neck with over-recruitment of the cervical muscles causing pian, loss of motion, sleep disturbances. He also reports stiffness in the low back that hinders overall function and sleep quality. He is a good candidate for skilled PT to address these issues and allow pt to resume normal ADL's without pain limiting him. PROBLEM LIST (Impacting functional limitations):  1. Decreased ADL/Functional Activities  2. Increased Pain  3. Decreased Activity Tolerance  4. Decreased Flexibility/Joint Mobility  5. Decreased [place] with Home Exercise Program INTERVENTIONS PLANNED:  1. Home Exercise Program (HEP)  2. Manual Therapy  3. Therapeutic Exercise/Strengthening    TREATMENT PLAN:  Effective Dates: 2018 TO 2018 (60 days). Frequency/Duration: 2 times a week for 60 Days  GOALS: (Goals have been discussed and agreed upon with patient.)  Short-Term Functional Goals: Time Frame: 2 weeks  1. Pt to demonstrate correct sitting posture without cues. - MET  2. Pt to report compliance with HEP. - MET  Discharge Goals: Time Frame: 8 weeks  1. Pt to demonstrate 70 degrees cervical rotation for pain free driving. - MET  2. Pt to report pain < 2 for restorative sleep patterns. - ongoing  3. Pt to demonstrate ability to elevate UE's without engaging UT muscles to perform ADL tasks without pain.  - ongoing                HISTORY:   History of Present Injury/Illness (Reason for Referral):  Pt waking up with stiffness in the neck and low back for a couple of years. He has tried new mattresses and pillows with little success. He denies radiating sxs. He gets momentary relief in a hot shower. He has neck and back pain throughout the day at work and even at rest.  Past Medical History/Comorbidities:   Mr. Kwan Sethi  has no past medical history on file. Mr. Kwan Sethi  has no past surgical history on file. 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 Date Last Reviewed:  7/6/2018   EXAMINATION:   Observation/Orthostatic Postural Assessment:          FH, rounded and FS;  symm pelvic landmarks, rigid thoracolumbar region  Palpation:          Marked tightness UT, levator, scalenes, scap region, paraspinals left > right. ROM:     AROM cervical: 6/27/18  Rotation right 0-77, left 0-70  SB, flex, ext WNL. Trunk range WNL but guarded through all ranges. Hams and piriformis tightness. Strength:     Weak scap stabilizers. UE's 5/5. 5/5 LE's but unable to engage gluts without paraspinals and hams. Special Tests:          unremarkable  Neurological Screen:        unremarkable  Functional Mobility:         Limited cervical mobility with faulty UE movement patterns. Overuse of paraspinals and hams with lifting, pushing, pulling with work tasks. Body Structures Involved:  1. Joints  2. Muscles Body Functions Affected:  1. Sensory/Pain  2. Neuromusculoskeletal  3. Movement Related Activities and Participation Affected:  1. General Tasks and Demands  2. Mobility  3. Self Care  4. Domestic Life  5. Community, Social and Civic Life   CLINICAL PRESENTATION:   CLINICAL DECISION MAKING:   Outcome Measure:    Tool Used: Neck Disability Index (NDI)  Score:  Initial: 6/50  Most Recent: X/50 (Date: -- )   Interpretation of Score: The Neck Disability Index is a revised form of the Oswestry Low Back Pain Index and is designed to measure the activities of daily living in person's with neck pain. Each section is scored on a 0-5 scale, 5 representing the greatest disability. The scores of each section are added together for a total score of 50. Score 0 1-10 11-20 21-30 31-40 41-49 50   Modifier CH CI CJ CK CL CM CN         Medical Necessity:   · Patient demonstrates good rehab potential due to higher previous functional level. Reason for Services/Other Comments:  · Patient continues to require present interventions due to patient's inability to sleep, rama ADL's without pain. TREATMENT:   (In addition to Assessment/Re-Assessment sessions the following treatments were rendered)  THERAPEUTIC EXERCISE: (see below for minutes):  Exercises per grid below to improve mobility and strength. Required moderate verbal and manual cues to promote proper body alignment, promote proper body posture, promote proper body mechanics and promote proper body breathing techniques. Progressed range, repetitions and complexity of movement as indicated. MANUAL THERAPY: (seee below for minutes): Soft tissue mobilization was utilized and necessary because of the patient's painful spasm and restricted motion of soft tissue.    MODALITIES: (see below for minutes):      for pain modulation     Date: 6/7/18 6/20/18  Visit 2 6/22/18  Visit 3 2/44/37  Visit 4  Re-cert for LBP 8/98/22  Visit 5 7/3/18  Visit 6 7/6/18  Visit 7       Modalities:                                                        Therapeutic Exercise: 28 mins 25 mins 25 mins 30 mins 35 mins 55 mins 45 mins       UT stretch 3 mins 6 mins 6 mins 4 mins 4 mins 4 mins        Levator stretch 3 mins 6 mins 6 mins 4 mins 4 mins 4 mins        Chin tuck 3 mins 2 mins 30 reps  30 reps 30 reps        scap retractions 3 mins 30 reps 30 reps           Cervical rotation 3 mins 6 mins 6 mins 4 mins          Diaphragmatic breathing 3 mins             Pt education, postural education, HEP 10 mins 3 mins  5 mins          LTR    3 mins 3 mins  3 mins       Hams stretching    4 mins 4 mins 6 mins supine 6 mins  supine       Piriformis stretching    5 mins seated, supine 6 mins seated 6 mins supine, seated 6 mins  Supine, seated       Glut sets without paraspinals, hams    3 mins          Bridging without paraspinals, hams    3 mins 30 reps 30 reps 30 reps       S/L hip abd keeping back relaxed     30 reps 30 reps 30 reps       Clam shells     30 reps 30 reps 30 reps       H/k pelvic tilts      3 mins 30 reps       H/k rocking back      3 mins 3 mins       Sit to stand without UE support        20 reps       Proper lifting mechanics with hip/knee flex vs lumbar motion       4 mins                     Proprioceptive Activities:                                                        Manual Therapy:  20 mins 20 mins 25 mins 10 mins         STM cervical - lumbar  15 mins 15 mins 25 mins 10 mins         Mobs for rotation/ext  5 mins 5 mins           Therapeutic Activities:                                                        HEP: see hand out  [facility] [facility] Portal  Treatment/Session Assessment: compliant with HEP. Gaining flexibility and awareness of more fluid movement patterns. · Pain/ Symptoms: Initial:   No pain. Back isn't quite as far along as my neck but it is getting there. I still get stiff at times. Post Session:  No pain. ·   Compliance with Program/Exercises: Will assess as treatment progresses. · Recommendations/Intent for next treatment session: \"Next visit will focus on advancements to more challenging activities\".   Total Treatment Duration:  PT Patient Time In/Time Out  Time In: 1100  Time Out: Marlon Pinto, PT

## 2018-07-11 ENCOUNTER — HOSPITAL ENCOUNTER (OUTPATIENT)
Dept: PHYSICAL THERAPY | Age: 53
Discharge: HOME | End: 2018-07-11
Payer: COMMERCIAL

## 2018-07-11 PROCEDURE — 97140 MANUAL THERAPY 1/> REGIONS: CPT

## 2018-07-11 PROCEDURE — 97110 THERAPEUTIC EXERCISES: CPT

## 2018-07-11 NOTE — PROGRESS NOTES
Luigi White  : 1965 [id number] [facility] [address]  [address] [place] [address].  [phone]   [phone]        OUTPATIENT PHYSICAL THERAPY:Daily Note and Progress Report 2018         ICD-10: Treatment Diagnosis: Cervicalgia (M54.2), Low back pain (M54.5)  Precautions/Allergies:   Codeine   Fall Risk Score: 1 (? 5 = High Risk)  MD Orders: eval and treat muscle spasms MEDICAL/REFERRING DIAGNOSIS:  Neck pain [M54.2]   DATE OF ONSET: 2 years ago  REFERRING PHYSICIAN: Tova Castrejon, ROGERS  RETURN PHYSICIAN APPOINTMENT: unknown     INITIAL ASSESSMENT:  Mr. Anastasia Moreno presents with stiffness in the neck with over-recruitment of the cervical muscles causing pian, loss of motion, sleep disturbances. He also reports stiffness in the low back that hinders overall function and sleep quality. He is a good candidate for skilled PT to address these issues and allow pt to resume normal ADL's without pain limiting him. PROBLEM LIST (Impacting functional limitations):  1. Decreased ADL/Functional Activities  2. Increased Pain  3. Decreased Activity Tolerance  4. Decreased Flexibility/Joint Mobility  5. Decreased [place] with Home Exercise Program INTERVENTIONS PLANNED:  1. Home Exercise Program (HEP)  2. Manual Therapy  3. Therapeutic Exercise/Strengthening    TREATMENT PLAN:  Effective Dates: 2018 TO 2018 (60 days). Frequency/Duration: 2 times a week for 60 Days  GOALS: (Goals have been discussed and agreed upon with patient.)  Short-Term Functional Goals: Time Frame: 2 weeks  1. Pt to demonstrate correct sitting posture without cues. - MET  2. Pt to report compliance with HEP. - MET  Discharge Goals: Time Frame: 8 weeks  1. Pt to demonstrate 70 degrees cervical rotation for pain free driving. - MET  2. Pt to report pain < 2 for restorative sleep patterns. - ongoing  3.  Pt to demonstrate ability to elevate UE's without engaging UT muscles to perform ADL tasks without pain. - ongoing                HISTORY:   History of Present Injury/Illness (Reason for Referral):  Pt waking up with stiffness in the neck and low back for a couple of years. He has tried new mattresses and pillows with little success. He denies radiating sxs. He gets momentary relief in a hot shower. He has neck and back pain throughout the day at work and even at rest.  Past Medical History/Comorbidities:   Mr. Rudy Zelaya  has no past medical history on file. Mr. Rudy Zelaya  has no past surgical history on file. 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 Date Last Reviewed:  7/11/2018   EXAMINATION:   Observation/Orthostatic Postural Assessment:          FH, rounded and FS;  symm pelvic landmarks, rigid thoracolumbar region  Palpation:          Mild tightness paraspinals left > right. ROM:     AROM cervical: 7/11/18  Rotation right 0-77, left 0-70  SB, flex, ext WNL. Trunk range WNL but guarded through all ranges. Hams and piriformis tightness. Strength:     UE's 5/5. 5/5 LE's       Special Tests:          unremarkable  Neurological Screen:        unremarkable  Functional Mobility:         WNL upper quadrant        Overuse of paraspinals and hams with lifting, pushing, pulling with work tasks. Body Structures Involved:  1. Joints  2. Muscles Body Functions Affected:  1. Sensory/Pain  2. Neuromusculoskeletal  3. Movement Related Activities and Participation Affected:  1. General Tasks and Demands  2. Mobility  3. Self Care  4. Domestic Life  5. Community, Social and Civic Life   CLINICAL PRESENTATION:   CLINICAL DECISION MAKING:   Outcome Measure: Tool Used: Neck Disability Index (NDI)  Score:  Initial: 6/50  Most Recent: 2/50 (Date: 7/11/18 )   Interpretation of Score:  The Neck Disability Index is a revised form of the Oswestry Low Back Pain Index and is designed to measure the activities of daily living in person's with neck pain. Each section is scored on a 0-5 scale, 5 representing the greatest disability. The scores of each section are added together for a total score of 50. Score 0 1-10 11-20 21-30 31-40 41-49 50   Modifier CH CI CJ CK CL CM CN         Medical Necessity:   · Patient demonstrates good rehab potential due to higher previous functional level. Reason for Services/Other Comments:  · Patient continues to require present interventions due to patient's inability to sleep, rama ADL's without pain. TREATMENT:   (In addition to Assessment/Re-Assessment sessions the following treatments were rendered)  THERAPEUTIC EXERCISE: (see below for minutes):  Exercises per grid below to improve mobility and strength. Required moderate verbal and manual cues to promote proper body alignment, promote proper body posture, promote proper body mechanics and promote proper body breathing techniques. Progressed range, repetitions and complexity of movement as indicated. MANUAL THERAPY: (seee below for minutes): Soft tissue mobilization was utilized and necessary because of the patient's painful spasm and restricted motion of soft tissue.    MODALITIES: (see below for minutes):      for pain modulation     Date: 6/7/18 6/20/18  Visit 2 6/22/18  Visit 3 2/78/69  Visit 4  Re-cert for LBP 7/87/08  Visit 5 7/3/18  Visit 6 7/6/18  Visit 7 7/11/18  Visit 8  PN      Modalities:                                                        Therapeutic Exercise: 28 mins 25 mins 25 mins 30 mins 35 mins 55 mins 45 mins 35 mins      UT stretch 3 mins 6 mins 6 mins 4 mins 4 mins 4 mins  3 mins      Levator stretch 3 mins 6 mins 6 mins 4 mins 4 mins 4 mins  3 mins      Chin tuck 3 mins 2 mins 30 reps  30 reps 30 reps        scap retractions 3 mins 30 reps 30 reps           Cervical rotation 3 mins 6 mins 6 mins 4 mins    5 reps      Diaphragmatic breathing 3 mins             Pt education, postural education, HEP 10 mins 3 mins  5 mins          LTR    3 mins 3 mins  3 mins 5 reps      Hams stretching    4 mins 4 mins 6 mins supine 6 mins  supine 5 reps  20 sec      Piriformis stretching    5 mins seated, supine 6 mins seated 6 mins supine, seated 6 mins  Supine, seated 5 reps  20 sec      Glut sets without paraspinals, hams    3 mins          Bridging without paraspinals, hams    3 mins 30 reps 30 reps 30 reps 30 reps  marching      S/L hip abd keeping back relaxed     30 reps 30 reps 30 reps 30 reps      Prone hip ext        30 reps      Clam shells     30 reps 30 reps 30 reps 30 reps      H/k pelvic tilts      3 mins 30 reps       H/k rocking back      3 mins 3 mins       Sit to stand without UE support        20 reps       Proper lifting mechanics with hip/knee flex vs lumbar motion       4 mins 4 mins                    Proprioceptive Activities:                                                        Manual Therapy:  20 mins 20 mins 25 mins 10 mins   10 mins      STM cervical - lumbar  15 mins 15 mins 25 mins 10 mins   10 mins      Mobs for rotation/ext  5 mins 5 mins           Therapeutic Activities:                                                        HEP: see hand out  [facility] Portal  Treatment/Session Assessment: Pt is doing well overall with less tightness, better mechanics and less pain. He still has episodes of stiffness/tightness due to the nature of his work and his natural body type to have less flexibility and not consistently stretching to maintain flexibility gains. He has a good understanding of proper mechanics, relaxation, posture, etc.    · Pain/ Symptoms: Initial:   No pain but woke up stiff in my neck this morning but I didn't do my stretches. Not sure why. Negligence on my part I guess. Post Session:  No pain, no stiffness. ·   Compliance with Program/Exercises:  Will assess as treatment progresses. · Recommendations/Intent for next treatment session: \"Next visit will focus on advancements to more challenging activities\".   Total Treatment Duration:  PT Patient Time In/Time Out  Time In: 0800  Time Out: 0845     Maritza Aguilar, PT

## 2018-07-13 ENCOUNTER — HOSPITAL ENCOUNTER (OUTPATIENT)
Dept: PHYSICAL THERAPY | Age: 53
Discharge: HOME | End: 2018-07-13
Payer: COMMERCIAL

## 2018-07-13 PROCEDURE — 97110 THERAPEUTIC EXERCISES: CPT

## 2018-07-13 NOTE — PROGRESS NOTES
Jony Manriquez  : 1965 [address] [address].  [phone]   [phone]        OUTPATIENT PHYSICAL THERAPY:Daily Note 2018         ICD-10: Treatment Diagnosis: Cervicalgia (M54.2), Low back pain (M54.5)  Precautions/Allergies:   Codeine   Fall Risk Score: 1 (? 5 = High Risk)  MD Orders: eval and treat muscle spasms MEDICAL/REFERRING DIAGNOSIS:  Neck pain [M54.2]   DATE OF ONSET: 2 years ago  REFERRING PHYSICIAN: Scott Castrejon NP  RETURN PHYSICIAN APPOINTMENT: unknown     INITIAL ASSESSMENT:  Mr. Zachary Hdz presents with stiffness in the neck with over-recruitment of the cervical muscles causing pian, loss of motion, sleep disturbances. He also reports stiffness in the low back that hinders overall function and sleep quality. He is a good candidate for skilled PT to address these issues and allow pt to resume normal ADL's without pain limiting him. PROBLEM LIST (Impacting functional limitations):  1. Decreased ADL/Functional Activities  2. Increased Pain  3. Decreased Activity Tolerance  4. Decreased Flexibility/Joint Mobility  5. Decreased [place] with Home Exercise Program INTERVENTIONS PLANNED:  1. Home Exercise Program (HEP)  2. Manual Therapy  3. Therapeutic Exercise/Strengthening    TREATMENT PLAN:  Effective Dates: 2018 TO 2018 (60 days). Frequency/Duration: 2 times a week for 60 Days  GOALS: (Goals have been discussed and agreed upon with patient.)  Short-Term Functional Goals: Time Frame: 2 weeks  1. Pt to demonstrate correct sitting posture without cues. - MET  2. Pt to report compliance with HEP. - MET  Discharge Goals: Time Frame: 8 weeks  1. Pt to demonstrate 70 degrees cervical rotation for pain free driving. - MET  2. Pt to report pain < 2 for restorative sleep patterns. - ongoing  3. Pt to demonstrate ability to elevate UE's without engaging UT muscles to perform ADL tasks without pain.  - ongoing                HISTORY:   History of Present Injury/Illness (Reason for Referral):  Pt waking up with stiffness in the neck and low back for a couple of years. He has tried new mattresses and pillows with little success. He denies radiating sxs. He gets momentary relief in a hot shower. He has neck and back pain throughout the day at work and even at rest.  Past Medical History/Comorbidities:   Mr. Kwan Sethi  has no past medical history on file. Mr. Kwan Sethi  has no past surgical history on file. 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 Date Last Reviewed:  7/13/2018   EXAMINATION:   Observation/Orthostatic Postural Assessment:          FH, rounded and FS;  symm pelvic landmarks, rigid thoracolumbar region  Palpation:          Mild tightness paraspinals left > right. ROM:     AROM cervical: 7/11/18  Rotation right 0-77, left 0-70  SB, flex, ext WNL. Trunk range WNL but guarded through all ranges. Hams and piriformis tightness. Strength:     UE's 5/5. 5/5 LE's       Special Tests:          unremarkable  Neurological Screen:        unremarkable  Functional Mobility:         WNL upper quadrant        Overuse of paraspinals and hams with lifting, pushing, pulling with work tasks. Body Structures Involved:  1. Joints  2. Muscles Body Functions Affected:  1. Sensory/Pain  2. Neuromusculoskeletal  3. Movement Related Activities and Participation Affected:  1. General Tasks and Demands  2. Mobility  3. Self Care  4. Domestic Life  5. Community, Social and Civic Life   CLINICAL PRESENTATION:   CLINICAL DECISION MAKING:   Outcome Measure: Tool Used: Neck Disability Index (NDI)  Score:  Initial: 6/50  Most Recent: 2/50 (Date: 7/11/18 )   Interpretation of Score:  The Neck Disability Index is a revised form of the Oswestry Low Back Pain Index and is designed to measure the activities of daily living in person's with neck pain. Each section is scored on a 0-5 scale, 5 representing the greatest disability. The scores of each section are added together for a total score of 50. Score 0 1-10 11-20 21-30 31-40 41-49 50   Modifier CH CI CJ CK CL CM CN         Medical Necessity:   · Patient demonstrates good rehab potential due to higher previous functional level. Reason for Services/Other Comments:  · Patient continues to require present interventions due to patient's inability to sleep, rama ADL's without pain. TREATMENT:   (In addition to Assessment/Re-Assessment sessions the following treatments were rendered)  THERAPEUTIC EXERCISE: (see below for minutes):  Exercises per grid below to improve mobility and strength. Required moderate verbal and manual cues to promote proper body alignment, promote proper body posture, promote proper body mechanics and promote proper body breathing techniques. Progressed range, repetitions and complexity of movement as indicated. MANUAL THERAPY: (seee below for minutes): Soft tissue mobilization was utilized and necessary because of the patient's painful spasm and restricted motion of soft tissue.    MODALITIES: (see below for minutes):      for pain modulation     Date: 6/7/18 6/20/18  Visit 2 6/22/18  Visit 3 8/89/59  Visit 4  Re-cert for LBP 4/02/15  Visit 5 7/3/18  Visit 6 7/6/18  Visit 7 7/11/18  Visit 8  PN 7/13/18  Visit 9     Modalities:                                                        Therapeutic Exercise: 28 mins 25 mins 25 mins 30 mins 35 mins 55 mins 45 mins 35 mins 40 mins     UT stretch 3 mins 6 mins 6 mins 4 mins 4 mins 4 mins  3 mins 3 mins     Levator stretch 3 mins 6 mins 6 mins 4 mins 4 mins 4 mins  3 mins 3 mins     Chin tuck 3 mins 2 mins 30 reps  30 reps 30 reps        scap retractions 3 mins 30 reps 30 reps      30 reps     Cervical rotation 3 mins 6 mins 6 mins 4 mins    5 reps      Diaphragmatic breathing 3 mins             Pt education, postural education, HEP 10 mins 3 mins  5 mins          LTR    3 mins 3 mins  3 mins 5 reps 5 reps     Hams stretching    4 mins 4 mins 6 mins supine 6 mins  supine 5 reps  20 sec 5 reps  20 sec     Piriformis stretching    5 mins seated, supine 6 mins seated 6 mins supine, seated 6 mins  Supine, seated 5 reps  20 sec 5 reps  20 sec     Glut sets without paraspinals, hams    3 mins          Bridging without paraspinals, hams    3 mins 30 reps 30 reps 30 reps 30 reps  marching 30 reps  marching     S/L hip abd keeping back relaxed     30 reps 30 reps 30 reps 30 reps 30 reps     Prone hip ext        30 reps 30 reps     Clam shells     30 reps 30 reps 30 reps 30 reps 30 reps     H/k pelvic tilts      3 mins 30 reps       H/k rocking back      3 mins 3 mins       Sit to stand without UE support        20 reps  30 reps     Proper lifting mechanics with hip/knee flex vs lumbar motion       4 mins 4 mins                    Proprioceptive Activities:                                                        Manual Therapy:  20 mins 20 mins 25 mins 10 mins   10 mins      STM cervical - lumbar  15 mins 15 mins 25 mins 10 mins   10 mins      Mobs for rotation/ext  5 mins 5 mins           Therapeutic Activities:                                                        HEP: see hand out  [facility] Portal  Treatment/Session Assessment: fluid movement patterns. · Pain/ Symptoms: Initial: Doing pretty good actually. Some stiffness in low back but doing better. Post Session:  Always feels good when we get done. ·   Compliance with Program/Exercises: Will assess as treatment progresses. · Recommendations/Intent for next treatment session: \"Next visit will focus on advancements to more challenging activities\".   Total Treatment Duration:  PT Patient Time In/Time Out  Time In: 1100  Time Out: Marlon Pinto, PT

## 2018-07-18 ENCOUNTER — HOSPITAL ENCOUNTER (OUTPATIENT)
Dept: PHYSICAL THERAPY | Age: 53
Discharge: HOME | End: 2018-07-18
Payer: COMMERCIAL

## 2018-07-18 PROCEDURE — 97140 MANUAL THERAPY 1/> REGIONS: CPT

## 2018-07-18 PROCEDURE — 97110 THERAPEUTIC EXERCISES: CPT

## 2018-07-18 NOTE — PROGRESS NOTES
Dary Ramos  : 1965 [address] [address] [facility] [address]  [address] [place] [address].  [phone]   [phone]        OUTPATIENT PHYSICAL THERAPY:Daily Note 2018         ICD-10: Treatment Diagnosis: Cervicalgia (M54.2), Low back pain (M54.5)  Precautions/Allergies:   Codeine   Fall Risk Score: 1 (? 5 = High Risk)  MD Orders: eval and treat muscle spasms MEDICAL/REFERRING DIAGNOSIS:  Neck pain [M54.2]   DATE OF ONSET: 2 years ago  REFERRING PHYSICIAN: Amber Castrejon, ROGERS  RETURN PHYSICIAN APPOINTMENT: unknown     INITIAL ASSESSMENT:  Mr. Rivas eByer presents with stiffness in the neck with over-recruitment of the cervical muscles causing pian, loss of motion, sleep disturbances. He also reports stiffness in the low back that hinders overall function and sleep quality. He is a good candidate for skilled PT to address these issues and allow pt to resume normal ADL's without pain limiting him. PROBLEM LIST (Impacting functional limitations):  1. Decreased ADL/Functional Activities  2. Increased Pain  3. Decreased Activity Tolerance  4. Decreased Flexibility/Joint Mobility  5. Decreased [place] with Home Exercise Program INTERVENTIONS PLANNED:  1. Home Exercise Program (HEP)  2. Manual Therapy  3. Therapeutic Exercise/Strengthening    TREATMENT PLAN:  Effective Dates: 2018 TO 2018 (60 days). Frequency/Duration: 2 times a week for 60 Days  GOALS: (Goals have been discussed and agreed upon with patient.)  Short-Term Functional Goals: Time Frame: 2 weeks  1. Pt to demonstrate correct sitting posture without cues. - MET  2. Pt to report compliance with HEP. - MET  Discharge Goals: Time Frame: 8 weeks  1. Pt to demonstrate 70 degrees cervical rotation for pain free driving. - MET  2. Pt to report pain < 2 for restorative sleep patterns. - ongoing  3. Pt to demonstrate ability to elevate UE's without engaging UT muscles to perform ADL tasks without pain.  - ongoing                HISTORY:   History of Present Injury/Illness (Reason for Referral):  Pt waking up with stiffness in the neck and low back for a couple of years. He has tried new mattresses and pillows with little success. He denies radiating sxs. He gets momentary relief in a hot shower. He has neck and back pain throughout the day at work and even at rest.  Past Medical History/Comorbidities:   Mr. Rivas Beyer  has no past medical history on file. Mr. Rivas Beyer  has no past surgical history on file. 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 Date Last Reviewed:  7/18/2018   EXAMINATION:   Observation/Orthostatic Postural Assessment:          FH, rounded and FS;  symm pelvic landmarks, rigid thoracolumbar region  Palpation:          Mild tightness paraspinals left > right. ROM:     AROM cervical: 7/11/18  Rotation right 0-77, left 0-70  SB, flex, ext WNL. Trunk range WNL but guarded through all ranges. Hams and piriformis tightness. Strength:     UE's 5/5. 5/5 LE's       Special Tests:          unremarkable  Neurological Screen:        unremarkable  Functional Mobility:         WNL upper quadrant        Overuse of paraspinals and hams with lifting, pushing, pulling with work tasks. Body Structures Involved:  1. Joints  2. Muscles Body Functions Affected:  1. Sensory/Pain  2. Neuromusculoskeletal  3. Movement Related Activities and Participation Affected:  1. General Tasks and Demands  2. Mobility  3. Self Care  4. Domestic Life  5. Community, Social and Civic Life   CLINICAL PRESENTATION:   CLINICAL DECISION MAKING:   Outcome Measure: Tool Used: Neck Disability Index (NDI)  Score:  Initial: 6/50  Most Recent: 2/50 (Date: 7/11/18 )   Interpretation of Score:  The Neck Disability Index is a revised form of the Oswestry Low Back Pain Index and is designed to measure the activities of daily living in person's with neck pain. Each section is scored on a 0-5 scale, 5 representing the greatest disability. The scores of each section are added together for a total score of 50. Score 0 1-10 11-20 21-30 31-40 41-49 50   Modifier CH CI CJ CK CL CM CN         Medical Necessity:   · Patient demonstrates good rehab potential due to higher previous functional level. Reason for Services/Other Comments:  · Patient continues to require present interventions due to patient's inability to sleep, rama ADL's without pain. TREATMENT:   (In addition to Assessment/Re-Assessment sessions the following treatments were rendered)  THERAPEUTIC EXERCISE: (see below for minutes):  Exercises per grid below to improve mobility and strength. Required moderate verbal and manual cues to promote proper body alignment, promote proper body posture, promote proper body mechanics and promote proper body breathing techniques. Progressed range, repetitions and complexity of movement as indicated. MANUAL THERAPY: (seee below for minutes): Soft tissue mobilization was utilized and necessary because of the patient's painful spasm and restricted motion of soft tissue.    MODALITIES: (see below for minutes):      for pain modulation     Date: 6/7/18 6/20/18  Visit 2 6/22/18  Visit 3 6/18/57  Visit 4  Re-cert for LBP 0/03/43  Visit 5 7/3/18  Visit 6 7/6/18  Visit 7 7/11/18  Visit 8  PN 7/13/18  Visit 9 7/18/18  Visit 10      Modalities:                                                                Therapeutic Exercise: 28 mins 25 mins 25 mins 30 mins 35 mins 55 mins 45 mins 35 mins 40 mins 35 mins      UT stretch 3 mins 6 mins 6 mins 4 mins 4 mins 4 mins  3 mins 3 mins 3 mins      Levator stretch 3 mins 6 mins 6 mins 4 mins 4 mins 4 mins  3 mins 3 mins 3 mins      Chin tuck 3 mins 2 mins 30 reps  30 reps 30 reps          scap retractions 3 mins 30 reps 30 reps      30 reps 30 reps      Cervical rotation 3 mins 6 mins 6 mins 4 mins    5 reps        Diaphragmatic breathing 3 mins               Pt education, postural education, HEP 10 mins 3 mins  5 mins            LTR    3 mins 3 mins  3 mins 5 reps 5 reps 3 mins      Hams stretching    4 mins 4 mins 6 mins supine 6 mins  supine 5 reps  20 sec 5 reps  20 sec 5 reps  20 sec      Piriformis stretching    5 mins seated, supine 6 mins seated 6 mins supine, seated 6 mins  Supine, seated 5 reps  20 sec 5 reps  20 sec 5 reps  20 sec      Glut sets without paraspinals, hams    3 mins            Bridging without paraspinals, hams    3 mins 30 reps 30 reps 30 reps 30 reps  marching 30 reps  marching 30 reps   marching      S/L hip abd keeping back relaxed     30 reps 30 reps 30 reps 30 reps 30 reps 30 reps      Prone hip ext        30 reps 30 reps 30 reps      Clam shells     30 reps 30 reps 30 reps 30 reps 30 reps 30 reps      H/k pelvic tilts      3 mins 30 reps         H/k rocking back      3 mins 3 mins         Sit to stand without UE support        20 reps  30 reps       Proper lifting mechanics with hip/knee flex vs lumbar motion       4 mins 4 mins                        Proprioceptive Activities:                                                                Manual Therapy:  20 mins 20 mins 25 mins 10 mins   10 mins  10 mins      STM cervical - lumbar  15 mins 15 mins 25 mins 10 mins   10 mins  10 mins      Mobs for rotation/ext  5 mins 5 mins             Therapeutic Activities:                                                                HEP: see hand out  Med[facility] Portal  Treatment/Session Assessment: left > right paraspinal tightness. Good control with ex's. · Pain/ Symptoms: Initial: no pain. The left side of my back gets tired before the rest of me does if that makes sense. Post Session: feels good ·   Compliance with Program/Exercises: Will assess as treatment progresses.   · Recommendations/Intent for next treatment session: \"Next visit will focus on advancements to more challenging activities\".   Total Treatment Duration:  PT Patient Time In/Time Out  Time In: 0800  Time Out: 0845     Marychuy Aguilar, PT

## 2018-07-20 ENCOUNTER — HOSPITAL ENCOUNTER (OUTPATIENT)
Dept: PHYSICAL THERAPY | Age: 53
Discharge: HOME | End: 2018-07-20
Payer: COMMERCIAL

## 2018-07-25 ENCOUNTER — HOSPITAL ENCOUNTER (OUTPATIENT)
Dept: PHYSICAL THERAPY | Age: 53
Discharge: HOME | End: 2018-07-25
Payer: COMMERCIAL

## 2018-07-25 PROCEDURE — 97110 THERAPEUTIC EXERCISES: CPT

## 2018-07-25 PROCEDURE — 97140 MANUAL THERAPY 1/> REGIONS: CPT

## 2018-07-25 NOTE — PROGRESS NOTES
Steff Smith  : 1965 [address]  [address] [facility] [address].  [phone]   [phone]        OUTPATIENT PHYSICAL THERAPY:Daily Note 2018         ICD-10: Treatment Diagnosis: Cervicalgia (M54.2), Low back pain (M54.5)  Precautions/Allergies:   Codeine   Fall Risk Score: 1 (? 5 = High Risk)  MD Orders: eval and treat muscle spasms MEDICAL/REFERRING DIAGNOSIS:  Neck pain [M54.2]   DATE OF ONSET: 2 years ago  REFERRING PHYSICIAN: Charlie Castrejon, NP  RETURN PHYSICIAN APPOINTMENT: unknown     INITIAL ASSESSMENT:  Mr. Chelsey Schafer presents with stiffness in the neck with over-recruitment of the cervical muscles causing pian, loss of motion, sleep disturbances. He also reports stiffness in the low back that hinders overall function and sleep quality. He is a good candidate for skilled PT to address these issues and allow pt to resume normal ADL's without pain limiting him. PROBLEM LIST (Impacting functional limitations):  1. Decreased ADL/Functional Activities  2. Increased Pain  3. Decreased Activity Tolerance  4. Decreased Flexibility/Joint Mobility  5. Decreased [place] with Home Exercise Program INTERVENTIONS PLANNED:  1. Home Exercise Program (HEP)  2. Manual Therapy  3. Therapeutic Exercise/Strengthening    TREATMENT PLAN:  Effective Dates: 2018 TO 2018 (60 days). Frequency/Duration: 2 times a week for 60 Days  GOALS: (Goals have been discussed and agreed upon with patient.)  Short-Term Functional Goals: Time Frame: 2 weeks  1. Pt to demonstrate correct sitting posture without cues. - MET  2. Pt to report compliance with HEP. - MET  Discharge Goals: Time Frame: 8 weeks  1. Pt to demonstrate 70 degrees cervical rotation for pain free driving. - MET  2. Pt to report pain < 2 for restorative sleep patterns. - ongoing  3. Pt to demonstrate ability to elevate UE's without engaging UT muscles to perform ADL tasks without pain.  - ongoing                HISTORY:   History of Present Injury/Illness (Reason for Referral):  Pt waking up with stiffness in the neck and low back for a couple of years. He has tried new mattresses and pillows with little success. He denies radiating sxs. He gets momentary relief in a hot shower. He has neck and back pain throughout the day at work and even at rest.  Past Medical History/Comorbidities:   Mr. Zachary Hdz  has no past medical history on file. Mr. Zachary Hdz  has no past surgical history on file. 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 Date Last Reviewed:  7/25/2018   EXAMINATION:   Observation/Orthostatic Postural Assessment:          FH, rounded and FS;  symm pelvic landmarks, rigid thoracolumbar region  Palpation:          Mild tightness paraspinals left > right. ROM:     AROM cervical: 7/11/18  Rotation right 0-77, left 0-70  SB, flex, ext WNL. Trunk range WNL but guarded through all ranges. Hams and piriformis tightness. Strength:     UE's 5/5. 5/5 LE's       Special Tests:          unremarkable  Neurological Screen:        unremarkable  Functional Mobility:         WNL upper quadrant        Overuse of paraspinals and hams with lifting, pushing, pulling with work tasks. Body Structures Involved:  1. Joints  2. Muscles Body Functions Affected:  1. Sensory/Pain  2. Neuromusculoskeletal  3. Movement Related Activities and Participation Affected:  1. General Tasks and Demands  2. Mobility  3. Self Care  4. Domestic Life  5. Community, Social and Civic Life   CLINICAL PRESENTATION:   CLINICAL DECISION MAKING:   Outcome Measure: Tool Used: Neck Disability Index (NDI)  Score:  Initial: 6/50  Most Recent: 2/50 (Date: 7/11/18 )   Interpretation of Score:  The Neck Disability Index is a revised form of the Oswestry Low Back Pain Index and is designed to measure the activities of daily living in person's with neck pain. Each section is scored on a 0-5 scale, 5 representing the greatest disability. The scores of each section are added together for a total score of 50. Score 0 1-10 11-20 21-30 31-40 41-49 50   Modifier CH CI CJ CK CL CM CN         Medical Necessity:   · Patient demonstrates good rehab potential due to higher previous functional level. Reason for Services/Other Comments:  · Patient continues to require present interventions due to patient's inability to sleep, rama ADL's without pain. TREATMENT:   (In addition to Assessment/Re-Assessment sessions the following treatments were rendered)  THERAPEUTIC EXERCISE: (see below for minutes):  Exercises per grid below to improve mobility and strength. Required moderate verbal and manual cues to promote proper body alignment, promote proper body posture, promote proper body mechanics and promote proper body breathing techniques. Progressed range, repetitions and complexity of movement as indicated. MANUAL THERAPY: (seee below for minutes): Soft tissue mobilization was utilized and necessary because of the patient's painful spasm and restricted motion of soft tissue.    MODALITIES: (see below for minutes):      for pain modulation     Date: 6/7/18 6/20/18  Visit 2 6/22/18  Visit 3 9/71/93  Visit 4  Re-cert for LBP 3/81/22  Visit 5 7/3/18  Visit 6 7/6/18  Visit 7 7/11/18  Visit 8  PN 7/13/18  Visit 9 7/18/18  Visit 10 7/25/18  Visit 11     Modalities:                                                                Therapeutic Exercise: 28 mins 25 mins 25 mins 30 mins 35 mins 55 mins 45 mins 35 mins 40 mins 35 mins 35 mins5      UT stretch 3 mins 6 mins 6 mins 4 mins 4 mins 4 mins  3 mins 3 mins 3 mins 3 mins     Levator stretch 3 mins 6 mins 6 mins 4 mins 4 mins 4 mins  3 mins 3 mins 3 mins 3 mins     Chin tuck 3 mins 2 mins 30 reps  30 reps 30 reps scap retractions 3 mins 30 reps 30 reps      30 reps 30 reps 30 reps     Cervical rotation 3 mins 6 mins 6 mins 4 mins    5 reps        Diaphragmatic breathing 3 mins               Pt education, postural education, HEP 10 mins 3 mins  5 mins            LTR    3 mins 3 mins  3 mins 5 reps 5 reps 3 mins 3 mins     Hams stretching    4 mins 4 mins 6 mins supine 6 mins  supine 5 reps  20 sec 5 reps  20 sec 5 reps  20 sec 5 reps  20 sec     Piriformis stretching    5 mins seated, supine 6 mins seated 6 mins supine, seated 6 mins  Supine, seated 5 reps  20 sec 5 reps  20 sec 5 reps  20 sec 5 reps  20 sec     Glut sets without paraspinals, hams    3 mins            Bridging without paraspinals, hams    3 mins 30 reps 30 reps 30 reps 30 reps  marching 30 reps  marching 30 reps   marching 30 reps  marching     S/L hip abd keeping back relaxed     30 reps 30 reps 30 reps 30 reps 30 reps 30 reps 30 reps     Prone hip ext        30 reps 30 reps 30 reps 30 reps     Clam shells     30 reps 30 reps 30 reps 30 reps 30 reps 30 reps 30 reps     H/k pelvic tilts      3 mins 30 reps         H/k rocking back      3 mins 3 mins         Sit to stand without UE support        20 reps  30 reps       Proper lifting mechanics with hip/knee flex vs lumbar motion       4 mins 4 mins                        Proprioceptive Activities:                                                                Manual Therapy:  20 mins 20 mins 25 mins 10 mins   10 mins  10 mins 10 mins0      STM cervical - lumbar  15 mins 15 mins 25 mins 10 mins   10 mins  10 mins 10 mins     Mobs for rotation/ext  5 mins 5 mins             Therapeutic Activities:                                                                HEP: see hand out  MedBridge Portal  Treatment/Session Assessment:  Good progress with less tightness/restriction in motion. Good mobility cervical and lumbar. Still learning not to engage paraspinals with activity.     · Pain/ Symptoms: Initial: just some stiffness in my low back but no pain Post Session: feels better after I leave here ·   Compliance with Program/Exercises: Will assess as treatment progresses. · Recommendations/Intent for next treatment session: \"Next visit will focus on advancements to more challenging activities\".   Total Treatment Duration:  PT Patient Time In/Time Out  Time In: 0800  Time Out: 0845     Jose Aguilar, PT

## 2018-08-08 ENCOUNTER — HOSPITAL ENCOUNTER (OUTPATIENT)
Dept: PHYSICAL THERAPY | Age: 53
Discharge: HOME | End: 2018-08-08
Payer: COMMERCIAL

## 2018-08-08 PROCEDURE — 97110 THERAPEUTIC EXERCISES: CPT

## 2018-08-08 PROCEDURE — 97140 MANUAL THERAPY 1/> REGIONS: CPT

## 2018-08-08 NOTE — PROGRESS NOTES
Roland Alan  : 1965 [id number] [facility],[address]  [address] [place] [address].  [phone]   [phone]        OUTPATIENT PHYSICAL THERAPY:Daily Note 2018         ICD-10: Treatment Diagnosis: Cervicalgia (M54.2), Low back pain (M54.5)  Precautions/Allergies:   Codeine   Fall Risk Score: 1 (? 5 = High Risk)  MD Orders: eval and treat muscle spasms MEDICAL/REFERRING DIAGNOSIS:  Neck pain [M54.2]   DATE OF ONSET: 2 years ago  REFERRING PHYSICIAN: Jaime Castrejon, ROGERS  RETURN PHYSICIAN APPOINTMENT: unknown     INITIAL ASSESSMENT:  Mr. Arlet Vasquez presents with stiffness in the neck with over-recruitment of the cervical muscles causing pian, loss of motion, sleep disturbances. He also reports stiffness in the low back that hinders overall function and sleep quality. He is a good candidate for skilled PT to address these issues and allow pt to resume normal ADL's without pain limiting him. PROBLEM LIST (Impacting functional limitations):  1. Decreased ADL/Functional Activities  2. Increased Pain  3. Decreased Activity Tolerance  4. Decreased Flexibility/Joint Mobility  5. Decreased [place] with Home Exercise Program INTERVENTIONS PLANNED:  1. Home Exercise Program (HEP)  2. Manual Therapy  3. Therapeutic Exercise/Strengthening    TREATMENT PLAN:  Effective Dates: 2018 TO 2018 (60 days). Frequency/Duration: 2 times a week for 60 Days  GOALS: (Goals have been discussed and agreed upon with patient.)  Short-Term Functional Goals: Time Frame: 2 weeks  1. Pt to demonstrate correct sitting posture without cues. - MET  2. Pt to report compliance with HEP. - MET  Discharge Goals: Time Frame: 8 weeks  1. Pt to demonstrate 70 degrees cervical rotation for pain free driving. - MET  2. Pt to report pain < 2 for restorative sleep patterns. - ongoing  3. Pt to demonstrate ability to elevate UE's without engaging UT muscles to perform ADL tasks without pain.  - ongoing                HISTORY:   History of Present Injury/Illness (Reason for Referral):  Pt waking up with stiffness in the neck and low back for a couple of years. He has tried new mattresses and pillows with little success. He denies radiating sxs. He gets momentary relief in a hot shower. He has neck and back pain throughout the day at work and even at rest.  Past Medical History/Comorbidities:   Mr. Kathy Ochoa  has no past medical history on file. Mr. Kathy Ochoa  has no past surgical history on file. 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 Date Last Reviewed:  8/8/2018   EXAMINATION:   Observation/Orthostatic Postural Assessment:          FH, rounded and FS;  symm pelvic landmarks, rigid thoracolumbar region  Palpation:          Mild tightness paraspinals left > right. ROM:     AROM cervical: 7/11/18  Rotation right 0-77, left 0-70  SB, flex, ext WNL. Trunk range WNL but guarded through all ranges. Hams and piriformis tightness. Strength:     UE's 5/5. 5/5 LE's       Special Tests:          unremarkable  Neurological Screen:        unremarkable  Functional Mobility:         WNL upper quadrant        Overuse of paraspinals and hams with lifting, pushing, pulling with work tasks. Body Structures Involved:  1. Joints  2. Muscles Body Functions Affected:  1. Sensory/Pain  2. Neuromusculoskeletal  3. Movement Related Activities and Participation Affected:  1. General Tasks and Demands  2. Mobility  3. Self Care  4. Domestic Life  5. Community, Social and Civic Life   CLINICAL PRESENTATION:   CLINICAL DECISION MAKING:   Outcome Measure: Tool Used: Neck Disability Index (NDI)  Score:  Initial: 6/50  Most Recent: 2/50 (Date: 7/11/18 )   Interpretation of Score:  The Neck Disability Index is a revised form of the Oswestry Low Back Pain Index and is designed to measure the activities of daily living in person's with neck pain. Each section is scored on a 0-5 scale, 5 representing the greatest disability. The scores of each section are added together for a total score of 50. Score 0 1-10 11-20 21-30 31-40 41-49 50   Modifier CH CI CJ CK CL CM CN         Medical Necessity:   · Patient demonstrates good rehab potential due to higher previous functional level. Reason for Services/Other Comments:  · Patient continues to require present interventions due to patient's inability to sleep, rama ADL's without pain. TREATMENT:   (In addition to Assessment/Re-Assessment sessions the following treatments were rendered)  THERAPEUTIC EXERCISE: (see below for minutes):  Exercises per grid below to improve mobility and strength. Required moderate verbal and manual cues to promote proper body alignment, promote proper body posture, promote proper body mechanics and promote proper body breathing techniques. Progressed range, repetitions and complexity of movement as indicated. MANUAL THERAPY: (seee below for minutes): Soft tissue mobilization was utilized and necessary because of the patient's painful spasm and restricted motion of soft tissue.    MODALITIES: (see below for minutes):      for pain modulation     Date: 6/7/18 6/20/18  Visit 2 6/22/18  Visit 3 4/16/05  Visit 4  Re-cert for LBP 2/49/82  Visit 5 7/3/18  Visit 6 7/6/18  Visit 7 7/11/18  Visit 8  PN 7/13/18  Visit 9 7/18/18  Visit 10 7/25/18  Visit 11 8/8/18  Visit 12      Modalities:                                                                        Therapeutic Exercise: 28 mins 25 mins 25 mins 30 mins 35 mins 55 mins 45 mins 35 mins 40 mins 35 mins [id number]  35 mins      UT stretch 3 mins 6 mins 6 mins 4 mins 4 mins 4 mins  3 mins 3 mins 3 mins 3 mins 3 mins      Levator stretch 3 mins 6 mins 6 mins 4 mins 4 mins 4 mins  3 mins 3 mins 3 mins 3 mins 3 mins      Chin tuck 3 mins 2 mins 30 reps  30 reps 30 reps            scap retractions 3 mins 30 reps 30 reps      30 reps 30 reps 30 reps 30 reps      Cervical rotation 3 mins 6 mins 6 mins 4 mins    5 reps          Diaphragmatic breathing 3 mins                 Pt education, postural education, HEP 10 mins 3 mins  5 mins              LTR    3 mins 3 mins  3 mins 5 reps 5 reps 3 mins 3 mins 3 mins      Hams stretching    4 mins 4 mins 6 mins supine 6 mins  supine 5 reps  20 sec 5 reps  20 sec 5 reps  20 sec 5 reps  20 sec 5 reps x 20 sec      Piriformis stretching    5 mins seated, supine 6 mins seated 6 mins supine, seated 6 mins  Supine, seated 5 reps  20 sec 5 reps  20 sec 5 reps  20 sec 5 reps  20 sec 5 reps x 20 sec      Glut sets without paraspinals, hams    3 mins              Bridging without paraspinals, hams    3 mins 30 reps 30 reps 30 reps 30 reps  marching 30 reps  marching 30 reps   marching 30 reps  marching Single leg   30 reps      S/L hip abd keeping back relaxed     30 reps 30 reps 30 reps 30 reps 30 reps 30 reps 30 reps       Prone hip ext        30 reps 30 reps 30 reps 30 reps       Clam shells     30 reps 30 reps 30 reps 30 reps 30 reps 30 reps 30 reps       H/k pelvic tilts      3 mins 30 reps           H/k rocking back      3 mins 3 mins           Sit to stand without UE support        20 reps  30 reps         Proper lifting mechanics with hip/knee flex vs lumbar motion       4 mins 4 mins          Press ups            10 reps      H/K alt arm/leg lifts            10 reps                                          Proprioceptive Activities:                                                                        Manual Therapy:  20 mins 20 mins 25 mins 10 mins   10 mins  10 mins [id number]  10 mins      STM cervical - lumbar  15 mins 15 mins 25 mins 10 mins   10 mins  10 mins 10 mins 10 mins      Mobs for rotation/ext  5 mins 5 mins               Therapeutic Activities: HEP: see hand out  [employer] Portal  Treatment/Session Assessment:  Mobility WNL, no palpable tightness. Good postural awareness. · Pain/ Symptoms: Initial:  No pain. Felt good on vacation. I can feel some stiffness now that I am back at work. Post Session: Feels good. ·   Compliance with Program/Exercises: Will assess as treatment progresses. · Recommendations/Intent for next treatment session: \"Next visit will focus on advancements to more challenging activities\".   Total Treatment Duration:  PT Patient Time In/Time Out  Time In: 0800  Time Out: 0845     Prem Aguilar, PT

## 2018-08-10 ENCOUNTER — HOSPITAL ENCOUNTER (OUTPATIENT)
Dept: PHYSICAL THERAPY | Age: 53
Discharge: HOME | End: 2018-08-10
Payer: COMMERCIAL

## 2018-08-10 PROCEDURE — 97110 THERAPEUTIC EXERCISES: CPT

## 2018-08-10 NOTE — PROGRESS NOTES
Pipo Brayan  : 1965 [address]issa [address]  [address] [address].  [phone]   [phone]        OUTPATIENT PHYSICAL THERAPY:Daily Note 8/10/2018         ICD-10: Treatment Diagnosis: Cervicalgia (M54.2), Low back pain (M54.5)  Precautions/Allergies:   Codeine   Fall Risk Score: 1 (? 5 = High Risk)  MD Orders: eval and treat muscle spasms MEDICAL/REFERRING DIAGNOSIS:  Neck pain [M54.2]   DATE OF ONSET: 2 years ago  REFERRING PHYSICIAN: Lalo Castrejon, ROGERS  RETURN PHYSICIAN APPOINTMENT: unknown     INITIAL ASSESSMENT:  Mr. Fernando Hernandez presents with stiffness in the neck with over-recruitment of the cervical muscles causing pian, loss of motion, sleep disturbances. He also reports stiffness in the low back that hinders overall function and sleep quality. He is a good candidate for skilled PT to address these issues and allow pt to resume normal ADL's without pain limiting him. PROBLEM LIST (Impacting functional limitations):  1. Decreased ADL/Functional Activities  2. Increased Pain  3. Decreased Activity Tolerance  4. Decreased Flexibility/Joint Mobility  5. Decreased [place] with Home Exercise Program INTERVENTIONS PLANNED:  1. Home Exercise Program (HEP)  2. Manual Therapy  3. Therapeutic Exercise/Strengthening    TREATMENT PLAN:  Effective Dates: 2018 TO 2018 (60 days). Frequency/Duration: 2 times a week for 60 Days  GOALS: (Goals have been discussed and agreed upon with patient.)  Short-Term Functional Goals: Time Frame: 2 weeks  1. Pt to demonstrate correct sitting posture without cues. - MET  2. Pt to report compliance with HEP. - MET  Discharge Goals: Time Frame: 8 weeks  1. Pt to demonstrate 70 degrees cervical rotation for pain free driving. - MET  2. Pt to report pain < 2 for restorative sleep patterns. - ongoing  3. Pt to demonstrate ability to elevate UE's without engaging UT muscles to perform ADL tasks without pain.  - ongoing                HISTORY:   History of Present Injury/Illness (Reason for Referral):  Pt waking up with stiffness in the neck and low back for a couple of years. He has tried new mattresses and pillows with little success. He denies radiating sxs. He gets momentary relief in a hot shower. He has neck and back pain throughout the day at work and even at rest.  Past Medical History/Comorbidities:   Mr. Rudy Zelaya  has no past medical history on file. Mr. Rudy Zelaya  has no past surgical history on file. 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 Date Last Reviewed:  8/10/2018   EXAMINATION:   Observation/Orthostatic Postural Assessment:          FH, rounded and FS;  symm pelvic landmarks, rigid thoracolumbar region  Palpation:          Mild tightness paraspinals left > right. ROM:     AROM cervical: 7/11/18  Rotation right 0-77, left 0-70  SB, flex, ext WNL. Trunk range WNL but guarded through all ranges. Hams and piriformis tightness. Strength:     UE's 5/5. 5/5 LE's       Special Tests:          unremarkable  Neurological Screen:        unremarkable  Functional Mobility:         WNL upper quadrant        Overuse of paraspinals and hams with lifting, pushing, pulling with work tasks. Body Structures Involved:  1. Joints  2. Muscles Body Functions Affected:  1. Sensory/Pain  2. Neuromusculoskeletal  3. Movement Related Activities and Participation Affected:  1. General Tasks and Demands  2. Mobility  3. Self Care  4. Domestic Life  5. Community, Social and Civic Life   CLINICAL PRESENTATION:   CLINICAL DECISION MAKING:   Outcome Measure: Tool Used: Neck Disability Index (NDI)  Score:  Initial: 6/50  Most Recent: 2/50 (Date: 7/11/18 )   Interpretation of Score:  The Neck Disability Index is a revised form of the Oswestry Low Back Pain Index and is designed to measure the activities of daily living in person's with neck pain. Each section is scored on a 0-5 scale, 5 representing the greatest disability. The scores of each section are added together for a total score of 50. Score 0 1-10 11-20 21-30 31-40 41-49 50   Modifier CH CI CJ CK CL CM CN         Medical Necessity:   · Patient demonstrates good rehab potential due to higher previous functional level. Reason for Services/Other Comments:  · Patient continues to require present interventions due to patient's inability to sleep, rama ADL's without pain. TREATMENT:   (In addition to Assessment/Re-Assessment sessions the following treatments were rendered)  THERAPEUTIC EXERCISE: (see below for minutes):  Exercises per grid below to improve mobility and strength. Required moderate verbal and manual cues to promote proper body alignment, promote proper body posture, promote proper body mechanics and promote proper body breathing techniques. Progressed range, repetitions and complexity of movement as indicated. MANUAL THERAPY: (seee below for minutes): Soft tissue mobilization was utilized and necessary because of the patient's painful spasm and restricted motion of soft tissue.    MODALITIES: (see below for minutes):      for pain modulation     Date: 6/7/18 6/20/18  Visit 2 6/22/18  Visit 3 2/41/81  Visit 4  Re-cert for LBP 3/57/38  Visit 5 7/3/18  Visit 6 7/6/18  Visit 7 7/11/18  Visit 8  PN 7/13/18  Visit 9 7/18/18  Visit 10 7/25/18  Visit 11 8/8/18  Visit 12 8/10/18  Visit 13      Modalities:                                                                            Therapeutic Exercise: 28 mins 25 mins 25 mins 30 mins 35 mins 55 mins 45 mins 35 mins 40 mins 35 mins [id number]  35 mins 40 mins      UT stretch 3 mins 6 mins 6 mins 4 mins 4 mins 4 mins  3 mins 3 mins 3 mins 3 mins 3 mins       Levator stretch 3 mins 6 mins 6 mins 4 mins 4 mins 4 mins  3 mins 3 mins 3 mins 3 mins 3 mins       Chin tuck 3 mins 2 mins 30 reps  30 reps 30 reps             scap retractions 3 mins 30 reps 30 reps      30 reps 30 reps 30 reps 30 reps       Cervical rotation 3 mins 6 mins 6 mins 4 mins    5 reps           Diaphragmatic breathing 3 mins                  Pt education, postural education, HEP 10 mins 3 mins  5 mins               LTR    3 mins 3 mins  3 mins 5 reps 5 reps 3 mins 3 mins 3 mins 10 reps      Hams stretching    4 mins 4 mins 6 mins supine 6 mins  supine 5 reps  20 sec 5 reps  20 sec 5 reps  20 sec 5 reps  20 sec 5 reps x 20 sec 5 x 20 sec      Piriformis stretching    5 mins seated, supine 6 mins seated 6 mins supine, seated 6 mins  Supine, seated 5 reps  20 sec 5 reps  20 sec 5 reps  20 sec 5 reps  20 sec 5 reps x 20 sec 5 x 20 sec      Glut sets without paraspinals, hams    3 mins               Bridging without paraspinals, hams    3 mins 30 reps 30 reps 30 reps 30 reps  marching 30 reps  marching 30 reps   marching 30 reps  marching Single leg   30 reps Single leg  30 reps      S/L hip abd keeping back relaxed     30 reps 30 reps 30 reps 30 reps 30 reps 30 reps 30 reps  30 reps      Prone hip ext        30 reps 30 reps 30 reps 30 reps  30 reps      Clam shells     30 reps 30 reps 30 reps 30 reps 30 reps 30 reps 30 reps  30 reps      H/k pelvic tilts      3 mins 30 reps            H/k rocking back      3 mins 3 mins            Sit to stand without UE support        20 reps  30 reps          Proper lifting mechanics with hip/knee flex vs lumbar motion       4 mins 4 mins           Press ups            10 reps 10 reps      H/K alt arm/leg lifts            10 reps 10 reps      Shoulder ext with scap stabilization             Blue   30 reps      Shoulder rows             Blue   30 reps      Forward rows             Blue  30 reps                         Proprioceptive Activities:                                                                            Manual Therapy:  20 mins 20 mins 25 mins 10 mins   10 mins  10 mins [id number]  10 mins       STM cervical - lumbar  15 mins 15 mins 25 mins 10 mins   10 mins  10 mins 10 mins 10 mins       Mobs for rotation/ext  5 mins 5 mins                Therapeutic Activities:                                                                            HEP: see hand out  [employer] Portal  Treatment/Session Assessment:  Full mobility cervical and lumbar. Pt is ready to begin his return to martial arts training. He no longer has muscle spasm and can perform work tasks with little to no pain consistently. Pt has more difficulty keeping left paraspinals relaxed doing S/L hip abd with right leg than right side when doing left hip abd. · Pain/ Symptoms: Initial:  No pain and no stiffness. My first hour this morning I felt a little stiffness at work but then it went away completely. Post Session: It feels real good. ·   Compliance with Program/Exercises: Will assess as treatment progresses. · Recommendations/Intent for next treatment session: \"Next visit will focus on advancements to more challenging activities\".   Total Treatment Duration:  PT Patient Time In/Time Out  Time In: 0200  Time Out: 0245     Jose Aguilar PT

## 2018-08-15 ENCOUNTER — HOSPITAL ENCOUNTER (OUTPATIENT)
Dept: PHYSICAL THERAPY | Age: 53
Discharge: HOME | End: 2018-08-15
Payer: COMMERCIAL

## 2018-08-15 PROCEDURE — 97110 THERAPEUTIC EXERCISES: CPT

## 2018-08-15 NOTE — PROGRESS NOTES
Micah Aguirre  : 1965 [id number] [facility] [address]  [address] [place] [address].  [phone]   [phone]        OUTPATIENT PHYSICAL THERAPY:Daily Note 8/15/2018         ICD-10: Treatment Diagnosis: Cervicalgia (M54.2), Low back pain (M54.5)  Precautions/Allergies:   Codeine   Fall Risk Score: 1 (? 5 = High Risk)  MD Orders: eval and treat muscle spasms MEDICAL/REFERRING DIAGNOSIS:  Neck pain [M54.2]   DATE OF ONSET: 2 years ago  REFERRING PHYSICIAN: Matt Castrejon, ROGERS  RETURN PHYSICIAN APPOINTMENT: unknown     INITIAL ASSESSMENT:  Mr. Osito Finnegan presents with stiffness in the neck with over-recruitment of the cervical muscles causing pian, loss of motion, sleep disturbances. He also reports stiffness in the low back that hinders overall function and sleep quality. He is a good candidate for skilled PT to address these issues and allow pt to resume normal ADL's without pain limiting him. PROBLEM LIST (Impacting functional limitations):  1. Decreased ADL/Functional Activities  2. Increased Pain  3. Decreased Activity Tolerance  4. Decreased Flexibility/Joint Mobility  5. Decreased [place] with Home Exercise Program INTERVENTIONS PLANNED:  1. Home Exercise Program (HEP)  2. Manual Therapy  3. Therapeutic Exercise/Strengthening    TREATMENT PLAN:  Effective Dates: 2018 TO 2018 (60 days). Frequency/Duration: 2 times a week for 60 Days  GOALS: (Goals have been discussed and agreed upon with patient.)  Short-Term Functional Goals: Time Frame: 2 weeks  1. Pt to demonstrate correct sitting posture without cues. - MET  2. Pt to report compliance with HEP. - MET  Discharge Goals: Time Frame: 8 weeks  1. Pt to demonstrate 70 degrees cervical rotation for pain free driving. - MET  2. Pt to report pain < 2 for restorative sleep patterns. - ongoing  3. Pt to demonstrate ability to elevate UE's without engaging UT muscles to perform ADL tasks without pain.  - ongoing                HISTORY:   History of Present Injury/Illness (Reason for Referral):  Pt waking up with stiffness in the neck and low back for a couple of years. He has tried new mattresses and pillows with little success. He denies radiating sxs. He gets momentary relief in a hot shower. He has neck and back pain throughout the day at work and even at rest.  Past Medical History/Comorbidities:   Mr. Lucinda Sosa  has no past medical history on file. Mr. Lucinda Sosa  has no past surgical history on file. 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 Date Last Reviewed:  8/15/2018   EXAMINATION:   Observation/Orthostatic Postural Assessment:          FH, rounded and FS;  symm pelvic landmarks, rigid thoracolumbar region  Palpation:          Mild tightness paraspinals left > right. ROM:     AROM cervical: 7/11/18  Rotation right 0-77, left 0-70  SB, flex, ext WNL. Trunk range WNL but guarded through all ranges. Hams and piriformis tightness. Strength:     UE's 5/5. 5/5 LE's       Special Tests:          unremarkable  Neurological Screen:        unremarkable  Functional Mobility:         WNL upper quadrant        Overuse of paraspinals and hams with lifting, pushing, pulling with work tasks. Body Structures Involved:  1. Joints  2. Muscles Body Functions Affected:  1. Sensory/Pain  2. Neuromusculoskeletal  3. Movement Related Activities and Participation Affected:  1. General Tasks and Demands  2. Mobility  3. Self Care  4. Domestic Life  5. Community, Social and Civic Life   CLINICAL PRESENTATION:   CLINICAL DECISION MAKING:   Outcome Measure: Tool Used: Neck Disability Index (NDI)  Score:  Initial: 6/50  Most Recent: 2/50 (Date: 7/11/18 )   Interpretation of Score:  The Neck Disability Index is a revised form of the Oswestry Low Back Pain Index and is designed to measure the activities of daily living in person's with neck pain. Each section is scored on a 0-5 scale, 5 representing the greatest disability. The scores of each section are added together for a total score of 50. Score 0 1-10 11-20 21-30 31-40 41-49 50   Modifier CH CI CJ CK CL CM CN         Medical Necessity:   · Patient demonstrates good rehab potential due to higher previous functional level. Reason for Services/Other Comments:  · Patient continues to require present interventions due to patient's inability to sleep, rama ADL's without pain. TREATMENT:   (In addition to Assessment/Re-Assessment sessions the following treatments were rendered)  THERAPEUTIC EXERCISE: (see below for minutes):  Exercises per grid below to improve mobility and strength. Required moderate verbal and manual cues to promote proper body alignment, promote proper body posture, promote proper body mechanics and promote proper body breathing techniques. Progressed range, repetitions and complexity of movement as indicated. MANUAL THERAPY: (seee below for minutes): Soft tissue mobilization was utilized and necessary because of the patient's painful spasm and restricted motion of soft tissue.    MODALITIES: (see below for minutes):      for pain modulation     Date: 6/7/18 6/20/18  Visit 2 6/22/18  Visit 3 0/47/29  Visit 4  Re-cert for LBP 6/18/82  Visit 5 7/3/18  Visit 6 7/6/18  Visit 7 7/11/18  Visit 8  PN 7/13/18  Visit 9 7/18/18  Visit 10 7/25/18  Visit 11 8/8/18  Visit 12 8/10/18  Visit 13 8/15/18  Visit 14     Modalities:                                                                            Therapeutic Exercise: 28 mins 25 mins 25 mins 30 mins 35 mins 55 mins 45 mins 35 mins 40 mins 35 mins [id number]  35 mins 40 mins 40 mins     UT stretch 3 mins 6 mins 6 mins 4 mins 4 mins 4 mins  3 mins 3 mins 3 mins 3 mins 3 mins  3 mins     Levator stretch 3 mins 6 mins 6 mins 4 mins 4 mins 4 mins  3 mins 3 mins 3 mins 3 mins 3 mins  3 mins     Chin tuck 3 mins 2 mins 30 reps  30 reps 30 reps             scap retractions 3 mins 30 reps 30 reps      30 reps 30 reps 30 reps 30 reps  30 reps     Cervical rotation 3 mins 6 mins 6 mins 4 mins    5 reps           Diaphragmatic breathing 3 mins                  Pt education, postural education, HEP 10 mins 3 mins  5 mins               LTR    3 mins 3 mins  3 mins 5 reps 5 reps 3 mins 3 mins 3 mins 10 reps 10 reps     Hams stretching    4 mins 4 mins 6 mins supine 6 mins  supine 5 reps  20 sec 5 reps  20 sec 5 reps  20 sec 5 reps  20 sec 5 reps x 20 sec 5 x 20 sec 5 x 20 sec     Piriformis stretching    5 mins seated, supine 6 mins seated 6 mins supine, seated 6 mins  Supine, seated 5 reps  20 sec 5 reps  20 sec 5 reps  20 sec 5 reps  20 sec 5 reps x 20 sec 5 x 20 sec 5 x 20 sec     Glut sets without paraspinals, hams    3 mins               Bridging without paraspinals, hams    3 mins 30 reps 30 reps 30 reps 30 reps  marching 30 reps  marching 30 reps   marching 30 reps  marching Single leg   30 reps Single leg  30 reps Single leg  30 reps     S/L hip abd keeping back relaxed     30 reps 30 reps 30 reps 30 reps 30 reps 30 reps 30 reps  30 reps 30 reps     Prone hip ext        30 reps 30 reps 30 reps 30 reps  30 reps 30 reps     Clam shells     30 reps 30 reps 30 reps 30 reps 30 reps 30 reps 30 reps  30 reps 30 reps     H/k pelvic tilts      3 mins 30 reps            H/k rocking back      3 mins 3 mins            Sit to stand without UE support        20 reps  30 reps          Proper lifting mechanics with hip/knee flex vs lumbar motion       4 mins 4 mins           Press ups            10 reps 10 reps 10 reps     H/K alt arm/leg lifts            10 reps 10 reps 10 reps     Shoulder ext with scap stabilization             Blue   30 reps Blue  30 reps     Shoulder rows             Blue   30 reps Blue  30 reps     Forward rows             Blue  30 reps Blue   30 reps                        Proprioceptive Activities:                                                                            Manual Therapy:  20 mins 20 mins 25 mins 10 mins   10 mins  10 mins [id number]  10 mins       STM cervical - lumbar  15 mins 15 mins 25 mins 10 mins   10 mins  10 mins 10 mins 10 mins       Mobs for rotation/ext  5 mins 5 mins                Therapeutic Activities:                                                                            HEP: see hand out  [employer] Portal  Treatment/Session Assessment:  Good mobility with good control and less tightness. Compliant with HEP. · Pain/ Symptoms: Initial:  No pain, just some stiffness in my legs this morning. Post Session:  I feel great now. ·   Compliance with Program/Exercises: Will assess as treatment progresses. · Recommendations/Intent for next treatment session: \"Next visit will focus on advancements to more challenging activities\".   Total Treatment Duration:  PT Patient Time In/Time Out  Time In: 0800  Time Out: 0845     Manuela Aguilar, OKSANA

## 2018-08-22 ENCOUNTER — HOSPITAL ENCOUNTER (OUTPATIENT)
Dept: PHYSICAL THERAPY | Age: 53
Discharge: HOME | End: 2018-08-22
Payer: COMMERCIAL

## 2018-08-24 ENCOUNTER — HOSPITAL ENCOUNTER (OUTPATIENT)
Dept: PHYSICAL THERAPY | Age: 53
Discharge: HOME | End: 2018-08-24
Payer: COMMERCIAL

## 2018-08-24 PROCEDURE — 97110 THERAPEUTIC EXERCISES: CPT

## 2018-08-24 NOTE — PROGRESS NOTES
Louie Barrera  : 1965 [address] [address], [place] [address].  [phone]   [phone]        OUTPATIENT PHYSICAL THERAPY:Daily Note 2018         ICD-10: Treatment Diagnosis: Cervicalgia (M54.2), Low back pain (M54.5)  Precautions/Allergies:   Codeine   Fall Risk Score: 1 (? 5 = High Risk)  MD Orders: eval and treat muscle spasms MEDICAL/REFERRING DIAGNOSIS:  Neck pain [M54.2]   DATE OF ONSET: 2 years ago  REFERRING PHYSICIAN: Ginette Castrejon, NP  RETURN PHYSICIAN APPOINTMENT: unknown     INITIAL ASSESSMENT:  Mr. Loras Rinne presents with stiffness in the neck with over-recruitment of the cervical muscles causing pian, loss of motion, sleep disturbances. He also reports stiffness in the low back that hinders overall function and sleep quality. He is a good candidate for skilled PT to address these issues and allow pt to resume normal ADL's without pain limiting him. PROBLEM LIST (Impacting functional limitations):  1. Decreased ADL/Functional Activities  2. Increased Pain  3. Decreased Activity Tolerance  4. Decreased Flexibility/Joint Mobility  5. Decreased [place] with Home Exercise Program INTERVENTIONS PLANNED:  1. Home Exercise Program (HEP)  2. Manual Therapy  3. Therapeutic Exercise/Strengthening    TREATMENT PLAN:  Effective Dates: 2018 TO 2018 (60 days). Frequency/Duration: 2 times a week for 60 Days  GOALS: (Goals have been discussed and agreed upon with patient.)  Short-Term Functional Goals: Time Frame: 2 weeks  1. Pt to demonstrate correct sitting posture without cues. - MET  2. Pt to report compliance with HEP. - MET  Discharge Goals: Time Frame: 8 weeks  1. Pt to demonstrate 70 degrees cervical rotation for pain free driving. - MET  2. Pt to report pain < 2 for restorative sleep patterns. - ongoing  3. Pt to demonstrate ability to elevate UE's without engaging UT muscles to perform ADL tasks without pain.  - ongoing                HISTORY:   History of Present Injury/Illness (Reason for Referral):  Pt waking up with stiffness in the neck and low back for a couple of years. He has tried new mattresses and pillows with little success. He denies radiating sxs. He gets momentary relief in a hot shower. He has neck and back pain throughout the day at work and even at rest.  Past Medical History/Comorbidities:   Mr. Osito Finnegan  has no past medical history on file. Mr. Osito Finnegan  has no past surgical history on file. 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 Date Last Reviewed:  8/24/2018   EXAMINATION:   Observation/Orthostatic Postural Assessment:          FH, rounded and FS;  symm pelvic landmarks, rigid thoracolumbar region  Palpation:          Mild tightness paraspinals left > right. ROM:     AROM cervical: 7/11/18  Rotation right 0-77, left 0-70  SB, flex, ext WNL. Trunk range WNL but guarded through all ranges. Hams and piriformis tightness. Strength:     UE's 5/5. 5/5 LE's       Special Tests:          unremarkable  Neurological Screen:        unremarkable  Functional Mobility:         WNL upper quadrant        Overuse of paraspinals and hams with lifting, pushing, pulling with work tasks. Body Structures Involved:  1. Joints  2. Muscles Body Functions Affected:  1. Sensory/Pain  2. Neuromusculoskeletal  3. Movement Related Activities and Participation Affected:  1. General Tasks and Demands  2. Mobility  3. Self Care  4. Domestic Life  5. Community, Social and Civic Life   CLINICAL PRESENTATION:   CLINICAL DECISION MAKING:   Outcome Measure: Tool Used: Neck Disability Index (NDI)  Score:  Initial: 6/50  Most Recent: 2/50 (Date: 7/11/18 )   Interpretation of Score:  The Neck Disability Index is a revised form of the Oswestry Low Back Pain Index and is designed to measure the activities of daily living in person's with neck pain. Each section is scored on a 0-5 scale, 5 representing the greatest disability. The scores of each section are added together for a total score of 50. Score 0 1-10 11-20 21-30 31-40 41-49 50   Modifier CH CI CJ CK CL CM CN         Medical Necessity:   · Patient demonstrates good rehab potential due to higher previous functional level. Reason for Services/Other Comments:  · Patient continues to require present interventions due to patient's inability to sleep, rama ADL's without pain. TREATMENT:   (In addition to Assessment/Re-Assessment sessions the following treatments were rendered)  THERAPEUTIC EXERCISE: (see below for minutes):  Exercises per grid below to improve mobility and strength. Required moderate verbal and manual cues to promote proper body alignment, promote proper body posture, promote proper body mechanics and promote proper body breathing techniques. Progressed range, repetitions and complexity of movement as indicated. MANUAL THERAPY: (seee below for minutes): Soft tissue mobilization was utilized and necessary because of the patient's painful spasm and restricted motion of soft tissue.    MODALITIES: (see below for minutes):      for pain modulation     Date: 6/7/18 6/20/18  Visit 2 6/22/18  Visit 3 2/44/14  Visit 4  Re-cert for LBP 3/73/21  Visit 5 7/3/18  Visit 6 7/6/18  Visit 7 7/11/18  Visit 8  PN 7/13/18  Visit 9 7/18/18  Visit 10 7/25/18  Visit 11 8/8/18  Visit 12 8/10/18  Visit 13 8/15/18  Visit 14 8/24/18  Visit 15    Modalities:                                                                            Therapeutic Exercise: 28 mins 25 mins 25 mins 30 mins 35 mins 55 mins 45 mins 35 mins 40 mins 35 mins [id number]  35 mins 40 mins 40 mins 40 mins    UT stretch 3 mins 6 mins 6 mins 4 mins 4 mins 4 mins  3 mins 3 mins 3 mins 3 mins 3 mins  3 mins 3 mins    Levator stretch 3 mins 6 mins 6 mins 4 mins 4 mins 4 mins  3 mins 3 mins 3 mins 3 mins 3 mins  3 mins 3 mins    Chin tuck 3 mins 2 mins 30 reps  30 reps 30 reps             scap retractions 3 mins 30 reps 30 reps      30 reps 30 reps 30 reps 30 reps  30 reps 30 reps    Cervical rotation 3 mins 6 mins 6 mins 4 mins    5 reps           Diaphragmatic breathing 3 mins                  Pt education, postural education, HEP 10 mins 3 mins  5 mins               LTR    3 mins 3 mins  3 mins 5 reps 5 reps 3 mins 3 mins 3 mins 10 reps 10 reps 10 reps    Hams stretching    4 mins 4 mins 6 mins supine 6 mins  supine 5 reps  20 sec 5 reps  20 sec 5 reps  20 sec 5 reps  20 sec 5 reps x 20 sec 5 x 20 sec 5 x 20 sec 5 x 20 sec    Piriformis stretching    5 mins seated, supine 6 mins seated 6 mins supine, seated 6 mins  Supine, seated 5 reps  20 sec 5 reps  20 sec 5 reps  20 sec 5 reps  20 sec 5 reps x 20 sec 5 x 20 sec 5 x 20 sec 5 x 20 sec    Glut sets without paraspinals, hams    3 mins               Bridging without paraspinals, hams    3 mins 30 reps 30 reps 30 reps 30 reps  marching 30 reps  marching 30 reps   marching 30 reps  marching Single leg   30 reps Single leg  30 reps Single leg  30 reps Single leg  30 reps    S/L hip abd keeping back relaxed     30 reps 30 reps 30 reps 30 reps 30 reps 30 reps 30 reps  30 reps 30 reps 30 reps    Prone hip ext        30 reps 30 reps 30 reps 30 reps  30 reps 30 reps 30 reps    Clam shells     30 reps 30 reps 30 reps 30 reps 30 reps 30 reps 30 reps  30 reps 30 reps 30 reps    H/k pelvic tilts      3 mins 30 reps            H/k rocking back      3 mins 3 mins            Sit to stand without UE support        20 reps  30 reps          Proper lifting mechanics with hip/knee flex vs lumbar motion       4 mins 4 mins           Press ups            10 reps 10 reps 10 reps 10 reps    H/K alt arm/leg lifts            10 reps 10 reps 10 reps 10 reps    Shoulder ext with scap stabilization Blue   30 reps Blue  30 reps Blue  30 reps    Shoulder rows             Blue   30 reps Blue  30 reps Blue  30 reps    Forward rows             Blue  30 reps Blue   30 reps Blue 30 reps                       Proprioceptive Activities:                                                                            Manual Therapy:  20 mins 20 mins 25 mins 10 mins   10 mins  10 mins [id number]  10 mins       STM cervical - lumbar  15 mins 15 mins 25 mins 10 mins   10 mins  10 mins 10 mins 10 mins       Mobs for rotation/ext  5 mins 5 mins                Therapeutic Activities:                                                                            HEP: see hand out  [employer] Portal  Treatment/Session Assessment: continues to show good mobility and strength without pain. Should be ready for discharge next visit. · Pain/ Symptoms: Initial:  I have been feeling pretty good actually. No pain. Not really any stiffness much anymore. Post Session:  Feels good. ·   Compliance with Program/Exercises: Will assess as treatment progresses. · Recommendations/Intent for next treatment session: \"Next visit will focus on advancements to more challenging activities\".   Total Treatment Duration:  PT Patient Time In/Time Out  Time In: 0200  Time Out: 0245     Gus Aguilar, PT

## 2018-08-29 ENCOUNTER — HOSPITAL ENCOUNTER (OUTPATIENT)
Dept: PHYSICAL THERAPY | Age: 53
Discharge: HOME | End: 2018-08-29
Payer: COMMERCIAL

## 2018-08-29 PROCEDURE — 97110 THERAPEUTIC EXERCISES: CPT

## 2018-08-29 NOTE — THERAPY DISCHARGE
Joel Josue  : 1965 [address] [address].  [phone]   [phone]        OUTPATIENT PHYSICAL THERAPY:Daily Note and Discharge 2018         ICD-10: Treatment Diagnosis: Cervicalgia (M54.2), Low back pain (M54.5)  Precautions/Allergies:   Codeine   Fall Risk Score: 1 (? 5 = High Risk)  MD Orders: eval and treat muscle spasms MEDICAL/REFERRING DIAGNOSIS:  Neck pain [M54.2]   DATE OF ONSET: 2 years ago  REFERRING PHYSICIAN: Del Castrejon NP  RETURN PHYSICIAN APPOINTMENT: unknown     INITIAL ASSESSMENT:  Mr. Johanna Morin presents with stiffness in the neck with over-recruitment of the cervical muscles causing pian, loss of motion, sleep disturbances. He also reports stiffness in the low back that hinders overall function and sleep quality. He is a good candidate for skilled PT to address these issues and allow pt to resume normal ADL's without pain limiting him. PROBLEM LIST (Impacting functional limitations):  1. Decreased ADL/Functional Activities  2. Increased Pain  3. Decreased Activity Tolerance  4. Decreased Flexibility/Joint Mobility  5. Decreased [place] with Home Exercise Program INTERVENTIONS PLANNED:  1. Home Exercise Program (HEP)  2. Manual Therapy  3. Therapeutic Exercise/Strengthening    TREATMENT PLAN:  Effective Dates: 2018 TO 2018 (60 days). Frequency/Duration: 2 times a week for 60 Days  GOALS: (Goals have been discussed and agreed upon with patient.)  Short-Term Functional Goals: Time Frame: 2 weeks  1. Pt to demonstrate correct sitting posture without cues. - MET  2. Pt to report compliance with HEP. - MET  Discharge Goals: Time Frame: 8 weeks  1. Pt to demonstrate 70 degrees cervical rotation for pain free driving. - MET  2. Pt to report pain < 2 for restorative sleep patterns. - MET  3.  Pt to demonstrate ability to elevate UE's without engaging UT muscles to perform ADL tasks without pain. - MET                HISTORY:   History of Present Injury/Illness (Reason for Referral):  Pt waking up with stiffness in the neck and low back for a couple of years. He has tried new mattresses and pillows with little success. He denies radiating sxs. He gets momentary relief in a hot shower. He has neck and back pain throughout the day at work and even at rest.  Past Medical History/Comorbidities:   Mr. Slim Isaacs  has no past medical history on file. Mr. Slim Isaacs  has no past surgical history on file. 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 Date Last Reviewed:  8/29/2018   EXAMINATION:   Observation/Orthostatic Postural Assessment:          FH, rounded and FS;  symm pelvic landmarks, rigid thoracolumbar region  Palpation:          Mild tightness paraspinals left > right. ROM:     AROM cervical: 8/29/18/18  Rotation right 0-77, left 0-75  SB, flex, ext WNL. Trunk range WNL      Strength:     UE's 5/5. 5/5 LE's       Special Tests:          unremarkable  Neurological Screen:        unremarkable  Functional Mobility:         WNL            Body Structures Involved:  1. Joints  2. Muscles Body Functions Affected:  1. Sensory/Pain  2. Neuromusculoskeletal  3. Movement Related Activities and Participation Affected:  1. General Tasks and Demands  2. Mobility  3. Self Care  4. Domestic Life  5. Community, Social and Civic Life   CLINICAL PRESENTATION:   CLINICAL DECISION MAKING:   Outcome Measure: Tool Used: Neck Disability Index (NDI)  Score:  Initial: 6/50  Most Recent: 2/50 (Date: 7/11/18 )                       1/50 (Date: 8/29/18)   Interpretation of Score:  The Neck Disability Index is a revised form of the Oswestry Low Back Pain Index and is designed to measure the activities of daily living in person's with neck pain. Each section is scored on a 0-5 scale, 5 representing the greatest disability. The scores of each section are added together for a total score of 50. Score 0 1-10 11-20 21-30 31-40 41-49 50   Modifier CH CI CJ CK CL CM CN        TREATMENT:   (In addition to Assessment/Re-Assessment sessions the following treatments were rendered)  THERAPEUTIC EXERCISE: (see below for minutes):  Exercises per grid below to improve mobility and strength. Required moderate verbal and manual cues to promote proper body alignment, promote proper body posture, promote proper body mechanics and promote proper body breathing techniques. Progressed range, repetitions and complexity of movement as indicated. MANUAL THERAPY: (seee below for minutes): Soft tissue mobilization was utilized and necessary because of the patient's painful spasm and restricted motion of soft tissue.    MODALITIES: (see below for minutes):      for pain modulation     Date: 6/7/18 6/20/18  Visit 2 6/22/18  Visit 3 1/26/17  Visit 4  Re-cert for LBP 2/41/30  Visit 5 7/3/18  Visit 6 7/6/18  Visit 7 7/11/18  Visit 8  PN 7/13/18  Visit 9 7/18/18  Visit 10 7/25/18  Visit 11 8/8/18  Visit 12 8/10/18  Visit 13 8/15/18  Visit 14 8/24/18  Visit 15 8/29/18  Visit 16  DC   Modalities:                                                                            Therapeutic Exercise: 28 mins 25 mins 25 mins 30 mins 35 mins 55 mins 45 mins 35 mins 40 mins 35 mins [id number]  35 mins 40 mins 40 mins 40 mins 40 mins   UT stretch 3 mins 6 mins 6 mins 4 mins 4 mins 4 mins  3 mins 3 mins 3 mins 3 mins 3 mins  3 mins 3 mins 3 mins   Levator stretch 3 mins 6 mins 6 mins 4 mins 4 mins 4 mins  3 mins 3 mins 3 mins 3 mins 3 mins  3 mins 3 mins 3 mins   Chin tuck 3 mins 2 mins 30 reps  30 reps 30 reps             scap retractions 3 mins 30 reps 30 reps      30 reps 30 reps 30 reps 30 reps  30 reps 30 reps    Cervical rotation 3 mins 6 mins 6 mins 4 mins    5 reps Diaphragmatic breathing 3 mins                  Pt education, postural education, HEP 10 mins 3 mins  5 mins               LTR    3 mins 3 mins  3 mins 5 reps 5 reps 3 mins 3 mins 3 mins 10 reps 10 reps 10 reps 10 reps   Hams stretching    4 mins 4 mins 6 mins supine 6 mins  supine 5 reps  20 sec 5 reps  20 sec 5 reps  20 sec 5 reps  20 sec 5 reps x 20 sec 5 x 20 sec 5 x 20 sec 5 x 20 sec 5 x 20 sec   Piriformis stretching    5 mins seated, supine 6 mins seated 6 mins supine, seated 6 mins  Supine, seated 5 reps  20 sec 5 reps  20 sec 5 reps  20 sec 5 reps  20 sec 5 reps x 20 sec 5 x 20 sec 5 x 20 sec 5 x 20 sec 5 x 20 sec   Glut sets without paraspinals, hams    3 mins               Bridging without paraspinals, hams    3 mins 30 reps 30 reps 30 reps 30 reps  marching 30 reps  marching 30 reps   marching 30 reps  marching Single leg   30 reps Single leg  30 reps Single leg  30 reps Single leg  30 reps Single leg 30 reps   S/L hip abd keeping back relaxed     30 reps 30 reps 30 reps 30 reps 30 reps 30 reps 30 reps  30 reps 30 reps 30 reps 30 reps   Prone hip ext        30 reps 30 reps 30 reps 30 reps  30 reps 30 reps 30 reps 30 reps   Clam shells     30 reps 30 reps 30 reps 30 reps 30 reps 30 reps 30 reps  30 reps 30 reps 30 reps 30 reps   H/k pelvic tilts      3 mins 30 reps            H/k rocking back      3 mins 3 mins            Sit to stand without UE support        20 reps  30 reps          Proper lifting mechanics with hip/knee flex vs lumbar motion       4 mins 4 mins           Press ups            10 reps 10 reps 10 reps 10 reps 10 reps   H/K alt arm/leg lifts            10 reps 10 reps 10 reps 10 reps 10 reps   Shoulder ext with scap stabilization             Blue   30 reps Blue  30 reps Blue  30 reps Blue  30 reps   Shoulder rows             Blue   30 reps Blue  30 reps Blue  30 reps Blue   30 reps   Forward rows             Blue  30 reps Blue   30 reps Blue 30 reps Blue   30 reps Proprioceptive Activities:                                                                            Manual Therapy:  20 mins 20 mins 25 mins 10 mins   10 mins  10 mins [id number]  10 mins       STM cervical - lumbar  15 mins 15 mins 25 mins 10 mins   10 mins  10 mins 10 mins 10 mins       Mobs for rotation/ext  5 mins 5 mins                Therapeutic Activities:                                                                            HEP: see hand out  [employer] Portal  Treatment/Session Assessment: Pt has done very well with both neck and back issues. He has full motion with no pain or limitations. He is to continue with his HEP and call with any questions.     · Pain/ Symptoms: Initial: 0/10 Post Session: 0/10   Total Treatment Duration:  PT Patient Time In/Time Out  Time In: 0800  Time Out: 0845     Jeanette Aguilar PT

## 2018-08-31 ENCOUNTER — APPOINTMENT (OUTPATIENT)
Dept: PHYSICAL THERAPY | Age: 53
End: 2018-08-31
Payer: COMMERCIAL

## 2019-11-13 ENCOUNTER — HOSPITAL ENCOUNTER (OUTPATIENT)
Dept: PHYSICAL THERAPY | Age: 54
End: 2019-11-13
Payer: COMMERCIAL

## 2019-11-15 ENCOUNTER — HOSPITAL ENCOUNTER (OUTPATIENT)
Dept: PHYSICAL THERAPY | Age: 54
Discharge: HOME | End: 2019-11-15
Payer: COMMERCIAL

## 2019-11-15 PROCEDURE — 97161 PT EVAL LOW COMPLEX 20 MIN: CPT

## 2019-11-15 PROCEDURE — 97110 THERAPEUTIC EXERCISES: CPT

## 2019-11-15 NOTE — THERAPY EVALUATION
Ramona Thacker : 1965 [address] @ [address] [place] [address], [address] [place] [address] [phone]   [phone] OUTPATIENT PHYSICAL THERAPY:Initial Assessment 11/15/2019 ICD-10: Treatment Diagnosis: Pain in right leg (M79.604), Pain in right knee (M25.561) and Stiffness of right knee, not elsewhere classified (M25.661) Precautions/Allergies:  
Codeine Fall Risk Score:   
 Ambulatory/Rehab Services H2 Model Falls Risk Assessment Risk Factors: 
     (1)  Gender [Male] Ability to Rise from Chair: 
     (0)  Ability to rise in a single movement Falls Prevention Plan: No modifications necessary Total: (5 or greater = High Risk): 1  [facility] . [facility] [employer] Patent #[id number]. Federal Law prohibits the replication, distribution or use without written permission from [facility] [place] MD Orders:  MEDICAL/REFERRING DIAGNOSIS: 
Knee pain, right [M25.561] DATE OF ONSET: 19 REFERRING PHYSICIAN: Aicha Castrejon NP 
RETURN PHYSICIAN APPOINTMENT: not currently scheduled INITIAL ASSESSMENT:  Mr. Flash Yates presents to therapy with right knee pain secondary to stiffness and decreased mobility of the joint. He does not demonstrate no clinical signs of acute meniscal injury but may have some chronic changes present at the patellofemoral and tibiofemoral joints. He has limited mobility into both extension and flexion along with decreased flexibility of the hamstrings and quadriceps musculature. He has some weakness through the quads and lateral hip musculature that contributes to difficulty with prolonged standing, going up or down an incline, or getting up from a chair. Skilled therapy is required to return to prior level of function. PROBLEM LIST (Impacting functional limitations): 1. Decreased Strength 2. Decreased ADL/Functional Activities 3. Decreased Ambulation Ability/Technique 4. Decreased Balance 5. Increased Pain 6. Decreased Activity Tolerance 7. Decreased Flexibility/Joint Mobility INTERVENTIONS PLANNED: 
1. Balance Exercise 2. Family Education 3. Gait Training 4. Home Exercise Program (HEP) 5. Manual Therapy 6. Neuromuscular Re-education/Strengthening 7. Range of Motion (ROM) 8. Therapeutic Activites 9. Therapeutic Exercise/Strengthening 10. modalities TREATMENT PLAN: 
Effective Dates: 11/15/2019 TO 2/16/2020 (90 days). Frequency/Duration: 2 times a week for 90 Days GOALS: (Goals have been discussed and agreed upon with patient.) Short-Term Functional Goals: Time Frame: 2 weeks 1. Patient will be independent with HEP. 2. Patient will have no swelling following a full day of work. 3. Patient will improve knee flexion to 125 ° to restore symmetric joint mobility and relieve stiffness. Discharge Goals: Time Frame: 4 weeks 1. Patient will have no pain with going up and down a flight of stairs. 2. Patient will improve knee extension strength to 5/5 to restore strength for tasks such as getting up from the ground. 3. Patient will report no pain with daily activity. Rehabilitation Potential For Stated Goals: Excellent Regarding Gerald Gamino's therapy, I certify that the treatment plan above will be carried out by a therapist or under their direction. Thank you for this referral, Pily Pizano DPT Referring Physician Signature: Ryan Crabtree NP              Date HISTORY:  
History of Present Injury/Illness (Reason for Referral): 
Patient presents to therapy with primary complaint of right knee pain. Initial symptoms began 11/7/19 but he denies any specific aggravating factor. He notes he may have bumped the knee at work, but does not recall a specific incident. 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  He describes his symptoms as a stiffness at the joint with swelling that worsens through the day. He reports swelling has generally decreased with ibuprofen, as he previously had swelling into the calf and thigh musculature. He has been taking 3200mg of ibuprofen daily. He also gets relief of stiffness with a hot shower. He has also been having difficulty with getting up from low positions and notes onset of crepitus at the knee. He denies buckling or giving way at the joint. Past Medical History/Comorbidities: Mr. Chapincito Winter  has no past medical history on file. Mr. Chapincito Winter  has no past surgical history on file. Social History/Living Environment:  
  patient lives at home with his family. Prior Level of Function/Work/Activity: 
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 [place]ant [place]:  
      RIGHT Current Medications:  No current outpatient medications on file. Date Last Reviewed:  11/15/2019 # of Personal Factors/Comorbidities that affect the Plan of Care: 0: LOW COMPLEXITY EXAMINATION:  
Observation/Orthostatic Postural Assessment:   
Increased swelling noted across the right knee. Palpation: No specific palpable tenderness. ROM:   
Right knee: 3 to 115 ° with stiff end feel into extension and flexion. Left knee: 2 to 130 ° SLR: 60 ° B Hip extension: 10 ° B Hip rotation: WNL Strength:  
Knee extension: 4/5 R; 5/5 L Knee flexion: 4/5 R; 5/5 L Hip abduction: 4/5 R; 5/5 L Hip ER: 5/5 B Hip extension: 4+/5 R; 5/5 L Special Tests:   
Yasmeen (-); Thessaly (-). Neurological Screen: 
Grossly intact Functional Mobility:  
WNL Balance: WNL Body Structures Involved: 1. Nerves 2. Bones 3. Joints 4. Muscles 5. Ligaments Body Functions Affected: 1. Sensory/Pain 2. Neuromusculoskeletal 
3. Movement Related Activities and Participation Affected: 1. General Tasks and Demands 2. Mobility 3. Self Care 4. Domestic Life 5. Interpersonal Interactions and Relationships 6. Community, Social and [place] # of elements that affect the Plan of Care: 1-2: LOW COMPLEXITY CLINICAL PRESENTATION:  
Presentation: Stable and uncomplicated: LOW COMPLEXITY CLINICAL DECISION MAKING:  
Tool Used: Lower Extremity Functional Scale (LEFS) Score:  Initial: 59/80 Most Recent: X/80 (Date: -- ) Interpretation of Score: 20 questions each scored on a 5 point scale with 0 representing \"extreme difficulty or unable to perform\" and 4 representing \"no difficulty\". The lower the score, the greater the functional disability. 80/80 represents no disability. Minimal detectable change is 9 points. Medical Necessity:  
· Patient is expected to demonstrate progress in strength, range of motion, balance and coordination ·  to increase independence with community mobility and return to prior level of function · . Reason for Services/Other Comments: 
· Patient has demonstrated an improvement in functional level by independent performance of HEP. · . Use of outcome tool(s) and clinical judgement create a POC that gives a: Clear prediction of patient's progress: LOW COMPLEXITY Total Treatment Duration: PT Patient Time In/Time Out Time In: 4083 Time Out: 1615 Nessa Duff DPT

## 2019-11-15 NOTE — PROGRESS NOTES
Bharathifly Zarco  : 1965  Primary: Miky Macario Charlie Rpn  Secondary:  [address] [address].  [phone]   [phone]      OUTPATIENT PHYSICAL THERAPY: Daily Treatment Note  11/15/2019   Pain in right leg (M79.604), Pain in right knee (M25.561) and Stiffness of right knee, not elsewhere classified (M25.661)  Effective Dates: 11/15/2019 TO 2020 (90 days). Frequency/Duration: 2 times a week for 90 Days  GOALS: (Goals have been discussed and agreed upon with patient.)  Short-Term Functional Goals: Time Frame: 2 weeks  1. Patient will be independent with HEP. 2. Patient will have no swelling following a full day of work. 3. Patient will improve knee flexion to 125 ° to restore symmetric joint mobility and relieve stiffness. Discharge Goals: Time Frame: 4 weeks  1. Patient will have no pain with going up and down a flight of stairs. 2. Patient will improve knee extension strength to 5/5 to restore strength for tasks such as getting up from the ground. 3. Patient will report no pain with daily activity. _________________________________________________________________________  Pre-treatment Symptoms/Complaints:  See initial evaluation  Pain: Initial: 2/10 Post Session:  1/10   Medications Last Reviewed:  11/15/2019  Updated Objective Findings:  Below measures from initial evaluation unless otherwise noted. Observation/Orthostatic Postural Assessment:    Increased swelling noted across the right knee. Palpation:    No specific palpable tenderness. ROM:    Right knee: 3 to 115 ° with stiff end feel into extension and flexion. Left knee: 2 to 130 °   SLR: 60 ° B  Hip extension: 10 ° B  Hip rotation: WNL  Strength:   Knee extension: 4/5 R; 5/5 L  Knee flexion: 4/5 R; 5/5 L  Hip abduction: 4/5 R; 5/5 L  Hip ER: 5/5 B  Hip extension: 4+/5 R; 5/5 L  Special Tests:    Yasmeen (-); Thessaly (-).   Neurological Screen:  Grossly intact  Functional Mobility:   WNL  Balance: WNL     TREATMENT:   THERAPEUTIC ACTIVITY: ( see below for minutes): Therapeutic activities per grid below to improve mobility, strength, balance and coordination. Required minimal visual, verbal, manual and tactile cues to improve independence and safety with daily activities . THERAPEUTIC EXERCISE: (see below for minutes):  Exercises per grid below to improve mobility, strength, balance and coordination. Required minimal verbal and manual cues to promote proper body alignment, promote proper body posture and promote proper body mechanics. Progressed resistance, range, repetitions and complexity of movement as indicated. MANUAL THERAPY: (see below for minutes): Joint mobilization and Soft tissue mobilization was utilized and necessary because of the patient's restricted joint motion, painful spasm, loss of articular motion and restricted motion of soft tissue. MODALITIES: (see below for minutes):      to decrease pain    SELF CARE: (see below for minutes): Procedure(s) (per grid) utilized to improve and/or restore self-care/home management as related to dressing, bathing and grooming. Required minimal verbal cueing to facilitate activities of daily living skills and compensatory activities. Date: 11/15/19       Modalities:                                Therapeutic Exercise: 25 min       Stretching 5t32ads to hamstring, 5x10 sec to prone quad       SLR 3x10       Hip abduction 3x10       Bridge 3x10                       Proprioceptive Activities:                                Manual Therapy:                        Therapeutic Activities:                                  HEP: see 11/15/19 flow sheet for specifics.     [employer] Portal  Treatment/Session Summary:    · Response to Treatment:  Patient is independent with performance of above described home program.  · Communication/Consultation:  None today  · Equipment provided today:  None today  · Recommendations/Intent for next treatment session: Next visit will focus on progression of strength and relief of pain.     Total Treatment Billable Duration:  45 minutes  PT Patient Time In/Time Out  Time In: 5535  Time Out: 3824 C[place] Toriy I, DPT    Future Appointments   Date Time Provider Kaylie Garner   11/20/2019  9:30 AM Lacey Reed [facility]   11/22/2019  9:30 AM Hernandez Beech, DPT [facility]   11/27/2019  8:45 AM Hernandez Beech, DPT [facility]   12/4/2019  8:45 AM Hernandez Beech, DPT [facility]   12/6/2019  8:45 AM Hernandez Beech, DPT [facility]   12/11/2019  8:45 AM Hernandez Beech, DPT [facility]   12/13/2019  8:45 AM Hernandez Beech, DPT [facility]   12/18/2019  8:45 AM Hernandez Beech, DPT [facility]   12/20/2019  8:45 AM Hernandez Beech, DPT [facility]   12/23/2019  8:45 AM Hernandez Beech, DPT [facility]   12/27/2019  8:45 AM Hernandez Beech, DPT [facility]   12/30/2019  8:45 AM Hernandez Beech, DPT [facility]

## 2019-11-20 ENCOUNTER — HOSPITAL ENCOUNTER (OUTPATIENT)
Dept: PHYSICAL THERAPY | Age: 54
Discharge: HOME | End: 2019-11-20
Payer: COMMERCIAL

## 2019-11-20 PROCEDURE — 97110 THERAPEUTIC EXERCISES: CPT

## 2019-11-20 NOTE — PROGRESS NOTES
River Matthews  : 1965  Primary: Saraiflorin Dorian Stacypaco Lizbeth  Secondary:  [address] [facility] @ [address] [place] [address].  [phone]   [phone]      OUTPATIENT PHYSICAL THERAPY: Daily Treatment Note  2019   Pain in right leg (M79.604), Pain in right knee (M25.561) and Stiffness of right knee, not elsewhere classified (M25.661)  Effective Dates: 11/15/2019 TO 2020 (90 days). Frequency/Duration: 2 times a week for 90 Days  GOALS: (Goals have been discussed and agreed upon with patient.)  Short-Term Functional Goals: Time Frame: 2 weeks  1. Patient will be independent with HEP. 2. Patient will have no swelling following a full day of work. 3. Patient will improve knee flexion to 125 ° to restore symmetric joint mobility and relieve stiffness. Discharge Goals: Time Frame: 4 weeks  1. Patient will have no pain with going up and down a flight of stairs. 2. Patient will improve knee extension strength to 5/5 to restore strength for tasks such as getting up from the ground. 3. Patient will report no pain with daily activity. _________________________________________________________________________  Pre-treatment Symptoms/Complaints: Pt states \"I don't have pain right now but the knee is stiff. \"  Pain: Initial: 0/10 R knee Post Session:  No increase   Medications Last Reviewed:  2019  Updated Objective Findings:  Below measures from initial evaluation unless otherwise noted. Observation/Orthostatic Postural Assessment:    Increased swelling noted across the right knee. Palpation:    No specific palpable tenderness. ROM:    Right knee: 3 to 115 ° with stiff end feel into extension and flexion.    Left knee: 2 to 130 °   SLR: 60 ° B  Hip extension: 10 ° B  Hip rotation: WNL  Strength:   Knee extension: 4/5 R; 5/5 L  Knee flexion: 4/5 R; 5/5 L  Hip abduction: 4/5 R; 5/5 L  Hip ER: 5/5 B  Hip extension: 4+/5 R; 5/5 L  Special Tests: Yasmeen (-); Helen (-). Neurological Screen:  Grossly intact  Functional Mobility:   WNL  Balance: WNL     TREATMENT:   THERAPEUTIC ACTIVITY: ( see below for minutes): Therapeutic activities per grid below to improve mobility, strength, balance and coordination. Required minimal visual, verbal, manual and tactile cues to improve independence and safety with daily activities . THERAPEUTIC EXERCISE: (see below for minutes):  Exercises per grid below to improve mobility, strength, balance and coordination. Required minimal verbal and manual cues to promote proper body alignment, promote proper body posture and promote proper body mechanics. Progressed resistance, range, repetitions and complexity of movement as indicated. MANUAL THERAPY: (see below for minutes): Joint mobilization and Soft tissue mobilization was utilized and necessary because of the patient's restricted joint motion, painful spasm, loss of articular motion and restricted motion of soft tissue. MODALITIES: (see below for minutes):      to decrease pain    SELF CARE: (see below for minutes): Procedure(s) (per grid) utilized to improve and/or restore self-care/home management as related to dressing, bathing and grooming. Required minimal verbal cueing to facilitate activities of daily living skills and compensatory activities.      Date: 11/15/19 11/20/19 (visit 2)      Modalities:                                Therapeutic Exercise: 25 min 40 min      Stretching [id number] to hamstring, 5x10 sec to prone quad 30 sec hold x 2 with strap      SLR 3x10 3x10      Hip abduction 3x10 3x10      Sit to stand  3x10 no UE support      Bridge 3x10 3x10 with glute squeeze      Lateral band walk  2 laps black      bike  5 min      Proprioceptive Activities:                                Manual Therapy:  5 min      Manual stretching to R knee  Into knee flexion in prone              Therapeutic Activities:                                  HEP: see 11/15/19 flow sheet for specifics. [employer] Portal  Treatment/Session Summary:    · Response to Treatment:  Patient did not report increase pain with exercise but he demonstrates a slight extensor lag with SLRs. Continue POC. · Communication/Consultation:  None today  · Equipment provided today:  None today  · Recommendations/Intent for next treatment session: Next visit will focus on progression of strength and relief of pain.     Total Treatment Billable Duration:  45 minutes  PT Patient Time In/Time Out  Time In: 0935  Time Out: [facility], [facility]    Future Appointments   Date Time Provider Kaylie Garner   11/22/2019  9:30 AM [facility]   11/27/2019  8:45 AM [facility]   12/4/2019  8:45 AM Trudy Le, DPT [facility]   12/6/2019  8:45 AM Trudy Le, DPT [facility]   12/11/2019  8:45 AM Trudy Le, DPT [facility]   12/13/2019  8:45 AM Trudy Le, DPT [facility]   12/18/2019  8:45 AM Trudy Le, DPT [facility]   12/20/2019  8:45 AM [facility]   12/23/2019  8:45 AM [facility]   12/27/2019  8:45 AM Trudy Le, DPT [facility]   12/30/2019  8:45 AM Trudy Le, DPT [facility]

## 2019-11-22 ENCOUNTER — HOSPITAL ENCOUNTER (OUTPATIENT)
Dept: PHYSICAL THERAPY | Age: 54
Discharge: HOME | End: 2019-11-22
Payer: COMMERCIAL

## 2019-12-04 ENCOUNTER — HOSPITAL ENCOUNTER (OUTPATIENT)
Dept: PHYSICAL THERAPY | Age: 54
Discharge: HOME | End: 2019-12-04
Payer: COMMERCIAL

## 2019-12-04 PROCEDURE — 97110 THERAPEUTIC EXERCISES: CPT

## 2019-12-04 NOTE — PROGRESS NOTES
Alida Feliz  : 1965  Primary: Landon Guerra Rpn  Secondary:  [address] [facility] @ [address]  [address] [place] [address]  [phone]   [phone]      OUTPATIENT PHYSICAL THERAPY: Daily Treatment Note  2019   Pain in right leg (M79.604), Pain in right knee (M25.561) and Stiffness of right knee, not elsewhere classified (M25.661)  Effective Dates: 11/15/2019 TO 2020 (90 days). Frequency/Duration: 2 times a week for 90 Days  GOALS: (Goals have been discussed and agreed upon with patient.)  Short-Term Functional Goals: Time Frame: 2 weeks  1. Patient will be independent with HEP. 2. Patient will have no swelling following a full day of work. 3. Patient will improve knee flexion to 125 ° to restore symmetric joint mobility and relieve stiffness. Discharge Goals: Time Frame: 4 weeks  1. Patient will have no pain with going up and down a flight of stairs. 2. Patient will improve knee extension strength to 5/5 to restore strength for tasks such as getting up from the ground. 3. Patient will report no pain with daily activity. _________________________________________________________________________  Pre-treatment Symptoms/Complaints: Pt states \"I don't have pain right now but the knee is stiff. \"  Pain: Initial: 0/10 R knee Post Session:  No increase   Medications Last Reviewed:  2019  Updated Objective Findings:  Below measures from initial evaluation unless otherwise noted. Observation/Orthostatic Postural Assessment:    Increased swelling noted across the right knee. Palpation:    No specific palpable tenderness. ROM:    Right knee: 3 to 115 ° with stiff end feel into extension and flexion.    Left knee: 2 to 130 °   SLR: 60 ° B  Hip extension: 10 ° B  Hip rotation: WNL  Strength:   Knee extension: 4/5 R; 5/5 L  Knee flexion: 4/5 R; 5/5 L  Hip abduction: 4/5 R; 5/5 L  Hip ER: 5/5 B  Hip extension: 4+/5 R; 5/5 L  Special Tests: Yasmeen (-); Helen (-). Neurological Screen:  Grossly intact  Functional Mobility:   WNL  Balance: WNL     TREATMENT:   THERAPEUTIC ACTIVITY: ( see below for minutes): Therapeutic activities per grid below to improve mobility, strength, balance and coordination. Required minimal visual, verbal, manual and tactile cues to improve independence and safety with daily activities . THERAPEUTIC EXERCISE: (see below for minutes):  Exercises per grid below to improve mobility, strength, balance and coordination. Required minimal verbal and manual cues to promote proper body alignment, promote proper body posture and promote proper body mechanics. Progressed resistance, range, repetitions and complexity of movement as indicated. MANUAL THERAPY: (see below for minutes): Joint mobilization and Soft tissue mobilization was utilized and necessary because of the patient's restricted joint motion, painful spasm, loss of articular motion and restricted motion of soft tissue. MODALITIES: (see below for minutes):      to decrease pain    SELF CARE: (see below for minutes): Procedure(s) (per grid) utilized to improve and/or restore self-care/home management as related to dressing, bathing and grooming. Required minimal verbal cueing to facilitate activities of daily living skills and compensatory activities.      Date: 11/15/19 11/20/19 (visit 2) 12/04/19 (visit 3)     Modalities:                                Therapeutic Exercise: 25 min 40 min 45 min     Stretching [id number] to hamstring, 5x10 sec to prone quad 30 sec hold x 2 with strap repeat     SLR 3x10 3x10 3x10 B     Hip abduction 3x10 3x10      Heel raises   3x10     Slant board stretch   1 min hold on level 2     Sit to stand  3x10 no UE support 3x10 from chair     Bridge 3x10 3x10 with glute squeeze 4x10 with glute squeeze     Lateral band walk  2 laps black 2 laps thick green     bike  5 min 6 min     Proprioceptive Activities: Manual Therapy:  5 min      Manual stretching to R knee  Into knee flexion in prone              Therapeutic Activities:                                  HEP: see 11/15/19 flow sheet for specifics. [employer] Portal  Treatment/Session Summary:    · Response to Treatment:  Patient demonstrates increased quadriceps strength with SLRs and he did not report pain. Continue POC. Pt states that he will be out of town next week. · Communication/Consultation:  None today  · Equipment provided today:  None today  · Recommendations/Intent for next treatment session: Next visit will focus on progression of strength and relief of pain.     Total Treatment Billable Duration:  45 minutes  PT Patient Time In/Time Out  Time In: 1100  Time Out: [address] PTA    Future Appointments   Date Time Provider Kaylie Garner   12/6/2019  8:45 AM Christopher Davis PTA [facility]   12/11/2019  8:45 AM Kendrick Cowart DPT [facility]   12/13/2019  8:45 AM Christopher Davis PTA [facility]   12/18/2019  8:45 AM Christopher Davis PTA [facility]   12/20/2019  8:45 AM Christopher Davis, PTA [facility]   12/23/2019  8:45 AM Christopher Davis, PTA [facility]   12/27/2019  8:45 AM Christopher Davis, PTA [facility]   12/30/2019  8:45 AM Christopher Davis PTA [facility]

## 2019-12-06 ENCOUNTER — HOSPITAL ENCOUNTER (OUTPATIENT)
Dept: PHYSICAL THERAPY | Age: 54
Discharge: HOME | End: 2019-12-06
Payer: COMMERCIAL

## 2019-12-06 PROCEDURE — 97110 THERAPEUTIC EXERCISES: CPT

## 2019-12-06 NOTE — PROGRESS NOTES
Radha Mcmillan  : 1965  Primary: Sushma Guerra Rpflorin  Secondary:  [address] @ [address], [address] [place] [address]  [phone]   [phone]      OUTPATIENT PHYSICAL THERAPY: Daily Treatment Note  2019   Pain in right leg (M79.604), Pain in right knee (M25.561) and Stiffness of right knee, not elsewhere classified (M25.661)  Effective Dates: 11/15/2019 TO 2020 (90 days). Frequency/Duration: 2 times a week for 90 Days  GOALS: (Goals have been discussed and agreed upon with patient.)  Short-Term Functional Goals: Time Frame: 2 weeks  1. Patient will be independent with HEP. 2. Patient will have no swelling following a full day of work. 3. Patient will improve knee flexion to 125 ° to restore symmetric joint mobility and relieve stiffness. Discharge Goals: Time Frame: 4 weeks  1. Patient will have no pain with going up and down a flight of stairs. 2. Patient will improve knee extension strength to 5/5 to restore strength for tasks such as getting up from the ground. 3. Patient will report no pain with daily activity. _________________________________________________________________________  Pre-treatment Symptoms/Complaints: Pt reports no pain right now. Pain: Initial: 0/10 R knee Post Session:  No increase   Medications Last Reviewed:  2019  Updated Objective Findings:  Below measures from initial evaluation unless otherwise noted. Observation/Orthostatic Postural Assessment:    Increased swelling noted across the right knee. Palpation:    No specific palpable tenderness. ROM:    Right knee: 3 to 115 ° with stiff end feel into extension and flexion. Left knee: 2 to 130 °   SLR: 60 ° B  Hip extension: 10 ° B  Hip rotation: WNL  Strength:   Knee extension: 4/5 R; 5/5 L  Knee flexion: 4/5 R; 5/5 L  Hip abduction: 4/5 R; 5/5 L  Hip ER: 5/5 B  Hip extension: 4+/5 R; 5/5 L  Special Tests:    Yasmeen (-);  proteonomix (-).  Neurological Screen:  Grossly intact  Functional Mobility:   WNL  Balance: WNL     TREATMENT:   THERAPEUTIC ACTIVITY: ( see below for minutes): Therapeutic activities per grid below to improve mobility, strength, balance and coordination. Required minimal visual, verbal, manual and tactile cues to improve independence and safety with daily activities . THERAPEUTIC EXERCISE: (see below for minutes):  Exercises per grid below to improve mobility, strength, balance and coordination. Required minimal verbal and manual cues to promote proper body alignment, promote proper body posture and promote proper body mechanics. Progressed resistance, range, repetitions and complexity of movement as indicated. MANUAL THERAPY: (see below for minutes): Joint mobilization and Soft tissue mobilization was utilized and necessary because of the patient's restricted joint motion, painful spasm, loss of articular motion and restricted motion of soft tissue. MODALITIES: (see below for minutes):      to decrease pain    SELF CARE: (see below for minutes): Procedure(s) (per grid) utilized to improve and/or restore self-care/home management as related to dressing, bathing and grooming. Required minimal verbal cueing to facilitate activities of daily living skills and compensatory activities.      Date: 11/15/19 11/20/19 (visit 2) 12/04/19 (visit 3) 12/6/19 (visit 4)    Modalities:                                Therapeutic Exercise: 25 min 40 min 45 min 45 min    Stretching [id number] to hamstring, 5x10 sec to prone quad 30 sec hold x 2 with strap repeat 30 sec hold x2 B    SLR 3x10 3x10 3x10 B 3x10 B    Hip abduction 3x10 3x10  3x10 B LE    Supine clamshells    3x10 blue band    Heel raises   3x10 3x10 on lincline    Slant board stretch   1 min hold on level 2 1 min hold on level 1    Sit to stand  3x10 no UE support 3x10 from chair 3x10 from chair    Bridge 3x10 3x10 with glute squeeze 4x10 with glute squeeze repeat    Lateral band walk  2 laps black 2 laps thick green 2 laps thick green     bike  5 min 6 min 6 min    Proprioceptive Activities:                                Manual Therapy:  5 min      Manual stretching to R knee  Into knee flexion in prone              Therapeutic Activities:                                  HEP: see 11/15/19 flow sheet for specifics. [employer] Portal  Treatment/Session Summary:    · Response to Treatment:  Patient fatigued easily with hip abduction exercises. Continue POC. Pt states that he will be out of town next week. · Communication/Consultation:  None today  · Equipment provided today:  None today  · Recommendations/Intent for next treatment session: Next visit will focus on progression of strength and relief of pain.     Total Treatment Billable Duration:  45 minutes  PT Patient Time In/Time Out  Time In: 1100  Time Out: [address] PTA    Future Appointments   Date Time Provider Kaylie Garner   12/11/2019  8:45 AM EDA Lara   12/13/2019  3:30 PM TOD Carmona   12/18/2019 11:45 AM TOD Carmona   12/20/2019  2:00 PM TOD CarmonaOFNABEEL [facility]

## 2019-12-11 ENCOUNTER — HOSPITAL ENCOUNTER (OUTPATIENT)
Dept: PHYSICAL THERAPY | Age: 54
Discharge: HOME | End: 2019-12-11
Payer: COMMERCIAL

## 2019-12-13 ENCOUNTER — HOSPITAL ENCOUNTER (OUTPATIENT)
Dept: PHYSICAL THERAPY | Age: 54
Discharge: HOME | End: 2019-12-13
Payer: COMMERCIAL

## 2019-12-18 ENCOUNTER — HOSPITAL ENCOUNTER (OUTPATIENT)
Dept: PHYSICAL THERAPY | Age: 54
Discharge: HOME | End: 2019-12-18
Payer: COMMERCIAL

## 2019-12-18 PROCEDURE — 97110 THERAPEUTIC EXERCISES: CPT

## 2019-12-18 NOTE — PROGRESS NOTES
Sherrie Villa  : 1965  Primary: Don Fairbanks Charlie Rpn  Secondary:  Katrina [address], [facility] [place].  [phone]   [phone]      OUTPATIENT PHYSICAL THERAPY: Daily Treatment Note  2019   Pain in right leg (M79.604), Pain in right knee (M25.561) and Stiffness of right knee, not elsewhere classified (M25.661)  Effective Dates: 11/15/2019 TO 2020 (90 days). Frequency/Duration: 2 times a week for 90 Days  GOALS: (Goals have been discussed and agreed upon with patient.)  Short-Term Functional Goals: Time Frame: 2 weeks  1. Patient will be independent with HEP. 2. Patient will have no swelling following a full day of work. 3. Patient will improve knee flexion to 125 ° to restore symmetric joint mobility and relieve stiffness. Discharge Goals: Time Frame: 4 weeks  1. Patient will have no pain with going up and down a flight of stairs. 2. Patient will improve knee extension strength to 5/5 to restore strength for tasks such as getting up from the ground. 3. Patient will report no pain with daily activity. _________________________________________________________________________  Pre-treatment Symptoms/Complaints: Pt reports no pain. Pt states \"The knee is just stiff in the morning. \"  Pain: Initial: 0/10 R knee Post Session:  No increase   Medications Last Reviewed:  2019  Updated Objective Findings:  Below measures from initial evaluation unless otherwise noted. Observation/Orthostatic Postural Assessment:    Increased swelling noted across the right knee. Palpation:    No specific palpable tenderness. ROM:    Right knee: 3 to 115 ° with stiff end feel into extension and flexion.    Left knee: 2 to 130 °   SLR: 60 ° B  Hip extension: 10 ° B  Hip rotation: WNL  Strength:   Knee extension: 4/5 R; 5/5 L  Knee flexion: 4/5 R; 5/5 L  Hip abduction: 4/5 R; 5/5 L  Hip ER: 5/5 B  Hip extension: 4+/5 R; 5/5 L  Special Tests:    Yasmeen (-); Thessaly (-). Neurological Screen:  Grossly intact  Functional Mobility:   WNL  Balance: WNL    Updated: R knee AROM 0-130 ° (12-18-19)       TREATMENT:   THERAPEUTIC ACTIVITY: ( see below for minutes): Therapeutic activities per grid below to improve mobility, strength, balance and coordination. Required minimal visual, verbal, manual and tactile cues to improve independence and safety with daily activities . THERAPEUTIC EXERCISE: (see below for minutes):  Exercises per grid below to improve mobility, strength, balance and coordination. Required minimal verbal and manual cues to promote proper body alignment, promote proper body posture and promote proper body mechanics. Progressed resistance, range, repetitions and complexity of movement as indicated. MANUAL THERAPY: (see below for minutes): Joint mobilization and Soft tissue mobilization was utilized and necessary because of the patient's restricted joint motion, painful spasm, loss of articular motion and restricted motion of soft tissue. MODALITIES: (see below for minutes):      to decrease pain    SELF CARE: (see below for minutes): Procedure(s) (per grid) utilized to improve and/or restore self-care/home management as related to dressing, bathing and grooming. Required minimal verbal cueing to facilitate activities of daily living skills and compensatory activities.      Date: 11/15/19 11/20/19 (visit 2) 12/04/19 (visit 3) 12/6/19 (visit 4) 12/18/19 (visit 5)   Modalities:                                Therapeutic Exercise: 25 min 40 min 45 min 45 min 45 min    Stretching [id number] to hamstring, 5x10 sec to prone quad 30 sec hold x 2 with strap repeat 30 sec hold x2 B Heel slides x30   Quad sets     x30   SLR 3x10 3x10 3x10 B 3x10 B 3x10 R LE   Hip abduction 3x10 3x10  3x10 B LE 3x10 R LE in side lying   Supine clamshells    3x10 blue band Side lying 3x10 blue B LE   Heel raises   3x10 3x10 on lincline 3x10 on incline   Slant board stretch   1 min hold on level 2 1 min hold on level 1 1 min hold on level 2   Sit to stand  3x10 no UE support 3x10 from chair 3x10 from chair 3x10 from chair no UE   Bridge 3x10 3x10 with glute squeeze 4x10 with glute squeeze repeat 3x10 with blue band   Lateral band walk  2 laps black 2 laps thick green 2 laps thick green  2 laps thick green band   bike  5 min 6 min 6 min 5 min   Proprioceptive Activities:                                Manual Therapy:  5 min      Manual stretching to R knee  Into knee flexion in prone              Therapeutic Activities:                                  HEP: see 11/15/19 flow sheet for specifics. [employer] Portal  Treatment/Session Summary:    · Response to Treatment:  Patient reported no pain today and required less verbal cueing for correct body mechanics. Continue POC. · Communication/Consultation:  None today  · Equipment provided today:  None today  · Recommendations/Intent for next treatment session: Next visit will focus on progression of strength and relief of pain.     Total Treatment Billable Duration:  45 minutes  PT Patient Time In/Time Out  Time In: 4084  Time Out: [address], PTA    Future Appointments   Date Time Provider Kaylie Garner   12/20/2019  2:45 PM Nevaeh [facility]

## 2019-12-20 ENCOUNTER — HOSPITAL ENCOUNTER (OUTPATIENT)
Dept: PHYSICAL THERAPY | Age: 54
Discharge: HOME | End: 2019-12-20
Payer: COMMERCIAL

## 2019-12-20 PROCEDURE — 97110 THERAPEUTIC EXERCISES: CPT

## 2019-12-20 NOTE — PROGRESS NOTES
Chou Coco  : 1965  Primary: Denise Gayle Charlie Nieves  Secondary:  [address] [facility] @ [address] [place] [address].  [phone]   [phone]      OUTPATIENT PHYSICAL THERAPY: Daily Treatment Note  2019   Pain in right leg (M79.604), Pain in right knee (M25.561) and Stiffness of right knee, not elsewhere classified (M25.661)  Effective Dates: 11/15/2019 TO 2020 (90 days). Frequency/Duration: 2 times a week for 90 Days  GOALS: (Goals have been discussed and agreed upon with patient.)  Short-Term Functional Goals: Time Frame: 2 weeks  1. Patient will be independent with HEP. 2. Patient will have no swelling following a full day of work. 3. Patient will improve knee flexion to 125 ° to restore symmetric joint mobility and relieve stiffness. Discharge Goals: Time Frame: 4 weeks  1. Patient will have no pain with going up and down a flight of stairs. 2. Patient will improve knee extension strength to 5/5 to restore strength for tasks such as getting up from the ground. 3. Patient will report no pain with daily activity. _________________________________________________________________________  Pre-treatment Symptoms/Complaints: Pt states \"The knee is tight when I bend it but it loosens up as I go. \"  Pain: Initial: 0/10 R knee Post Session:  No increase   Medications Last Reviewed:  2019  Updated Objective Findings:  Below measures from initial evaluation unless otherwise noted. Observation/Orthostatic Postural Assessment:    Increased swelling noted across the right knee. Palpation:    No specific palpable tenderness. ROM:    Right knee: 3 to 115 ° with stiff end feel into extension and flexion.    Left knee: 2 to 130 °   SLR: 60 ° B  Hip extension: 10 ° B  Hip rotation: WNL  Strength:   Knee extension: 4/5 R; 5/5 L  Knee flexion: 4/5 R; 5/5 L  Hip abduction: 4/5 R; 5/5 L  Hip ER: 5/5 B  Hip extension: 4+/5 R; 5/5 L  Special Tests:    [facility] (-); [facility] (-). Neurological Screen:  Grossly intact  Functional Mobility:   WNL  Balance: WNL    Updated: R knee AROM 0-130 ° (12-18-19)       TREATMENT:   THERAPEUTIC ACTIVITY: ( see below for minutes): Therapeutic activities per grid below to improve mobility, strength, balance and coordination. Required minimal visual, verbal, manual and tactile cues to improve independence and safety with daily activities . THERAPEUTIC EXERCISE: (see below for minutes):  Exercises per grid below to improve mobility, strength, balance and coordination. Required minimal verbal and manual cues to promote proper body alignment, promote proper body posture and promote proper body mechanics. Progressed resistance, range, repetitions and complexity of movement as indicated. MANUAL THERAPY: (see below for minutes): Joint mobilization and Soft tissue mobilization was utilized and necessary because of the patient's restricted joint motion, painful spasm, loss of articular motion and restricted motion of soft tissue. MODALITIES: (see below for minutes):      to decrease pain    SELF CARE: (see below for minutes): Procedure(s) (per grid) utilized to improve and/or restore self-care/home management as related to dressing, bathing and grooming. Required minimal verbal cueing to facilitate activities of daily living skills and compensatory activities.      Date: 11/15/19 11/20/19 (visit 2) 12/04/19 (visit 3) 12/6/19 (visit 4) 12/18/19 (visit 5) 12/20/19 (visit 6)   Modalities:                                    Therapeutic Exercise: 25 min 40 min 45 min 45 min 45 min  25 min   Stretching 2l60pms to hamstring, 5x10 sec to prone quad 30 sec hold x 2 with strap repeat 30 sec hold x2 B Heel slides x30 Heel slides x30   Quad sets     x30    SLR 3x10 3x10 3x10 B 3x10 B 3x10 R LE    Hip abduction 3x10 3x10  3x10 B LE 3x10 R LE in side lying    Supine clamshells    3x10 blue band Side lying 3x10 blue B LE Heel raises   3x10 3x10 on lincline 3x10 on incline    Slant board stretch   1 min hold on level 2 1 min hold on level 1 1 min hold on level 2    Sit to stand  3x10 no UE support 3x10 from chair 3x10 from chair 3x10 from chair no UE    Walking warm up (knee hug and quad stretch)      1 lap each   Bridge 3x10 3x10 with glute squeeze 4x10 with glute squeeze repeat 3x10 with blue band 3x10 with SB hamstring curls   Lateral band walk  2 laps black 2 laps thick green 2 laps thick green  2 laps thick green band repeat   bike  5 min 6 min 6 min 5 min 4 min   Proprioceptive Activities:                                    Manual Therapy:  5 min       Manual stretching to R knee  Into knee flexion in prone                Therapeutic Activities:                                      HEP: see 11/15/19 flow sheet for specifics. [employer] [facility]  Treatment/Session Summary:    · Response to Treatment:  Patient was 20 min late because he had to move his truck and back hoe. Pt did not report increased pain but he states that the R knee is tight into flexion. Continue POC. · Communication/Consultation:  None today  · Equipment provided today:  None today  · Recommendations/Intent for next treatment session: Next visit will focus on progression of strength and relief of pain. Total Treatment Billable Duration:  25 minutes  PT Patient Time In/Time Out  Time In: 0305  Time Out: [address], PTA    No future appointments.

## 2019-12-23 ENCOUNTER — APPOINTMENT (OUTPATIENT)
Dept: PHYSICAL THERAPY | Age: 54
End: 2019-12-23
Payer: COMMERCIAL

## 2019-12-27 ENCOUNTER — APPOINTMENT (OUTPATIENT)
Dept: PHYSICAL THERAPY | Age: 54
End: 2019-12-27
Payer: COMMERCIAL

## 2019-12-30 ENCOUNTER — APPOINTMENT (OUTPATIENT)
Dept: PHYSICAL THERAPY | Age: 54
End: 2019-12-30
Payer: COMMERCIAL

## 2020-02-06 NOTE — PROGRESS NOTES
Vanessa López   (:1965) [id number] [facility] [address]  [address] [place] [address].  [phone]   [phone]           Discontinuation summary    REFERRING PHYSICIAN: Kenna Sepulveda NP  Return Physician Appointment: to be determined  MEDICAL/REFERRING DIAGNOSIS:  · Knee pain, right [M25.561]  ATTENDANCE: Vanessa López has attended 5 out of 11 visits, with 3 cancellation(s) and 3 no shows. ASSESSMENT:  DATE: 2020    PROGRESS: Vanessa López did not schedule any follow up appointments after his last visit. He is currently taking care of his elderly father. RECOMMENDATIONS: Discharge to [facility].     Thank you for this referral,  Rocky Wyatt PTA     Referring Physician Signature: Kenna Sepulveda NP          Date

## 2020-09-30 ENCOUNTER — HOSPITAL ENCOUNTER (OUTPATIENT)
Dept: PHYSICAL THERAPY | Age: 55
Discharge: HOME | End: 2020-09-30
Payer: COMMERCIAL

## 2020-09-30 PROCEDURE — 97161 PT EVAL LOW COMPLEX 20 MIN: CPT

## 2020-09-30 PROCEDURE — 97110 THERAPEUTIC EXERCISES: CPT

## 2020-09-30 NOTE — THERAPY EVALUATION
Mert Gimenez  : 1965  Primary: Shital Alvares  Secondary:  [address]  [address] [facility], [address] [place] [address]  [phone]   [phone]       OUTPATIENT PHYSICAL THERAPY:Initial Assessment 2020   ICD-10: Treatment Diagnosis: Pain in left shoulder (M25.512) and Stiffness of left shoulder, not elsewhere classified (M25.612)  Precautions/Allergies:   Codeine     Ambulatory/Rehab Services H2 Model Falls Risk Assessment    Risk Factors:       (1)  Gender [Male] Ability to Rise from Chair:       (0)  Ability to rise in a single movement    Falls Prevention Plan:       No modifications necessary   Total: (5 or greater = High Risk): 1     [facility] [employer]. All Rights Reserved. [facility] [employer] Patent #[id number]. Federal Law prohibits the replication, distribution or use without written permission from [employer]      MEDICAL/REFERRING DIAGNOSIS:  Pain in left shoulder [M25.512]   DATE OF ONSET: 20  REFERRING PHYSICIAN: Jannette Hutchinson MD MD Orders: evaluate and treat  Return MD Appointment: TBD   INITIAL ASSESSMENT:  Mr. Talon Edwards presents with impaired strength and pain of left shoulder . This limits reaching and lifting tolerance and restricts ability to participate in ADLs, functional mobility and recreational activities. . Patient would benefit from skilled physical therapy to address above impairments. PROBLEM LIST (Impacting functional limitations):  1. Decreased Strength  2. Decreased ADL/Functional Activities  3. Increased Pain  4. Decreased Activity Tolerance  5. Decreased Flexibility/Joint Mobility INTERVENTIONS PLANNED: (Treatment may consist of any combination of the following)  1. Cryotherapy  2. Electrical Stimulation  3. Heat  4. Home Exercise Program (HEP)  5. Manual Therapy  6. Neuromuscular Re-education/Strengthening  7. Therapeutic Activites  8.  Therapeutic Exercise/Strengthening   TREATMENT PLAN:  Effective Dates: 9/30/2020 TO 10/30/2020 (30 days). Frequency/Duration: 2 times a week for 30 Day(s)  GOALS: (Goals have been discussed and agreed upon with patient.)  Short-Term Functional Goals: Time Frame: 2 weeks  # Goal Status   1 Pt will confirm compliance with home program to facilitate improvement in function. 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 Thank you for this referral,  Anabel Trammell, PT, DPT     Referring Physician Signature: Isabelle Cornelius MD _______________________________ Date _____________     HISTORY:   History of Injury/Illness (Reason for Referral):  Pt reports on 8/17/20 he was using walk-behind tractor and pulled start cord but it was caught so he strained his L shoulder. He reports the pain improved after several days but now still hurts with certain movements. He also reports it aches and throbs at night which significantly interferes with his sleep. He had x-ray and MRI which revealed small tear of rotator cuff. He would like to avoid surgery. HE reports no pain at rest. He is currently working on light duty. He denies radicular symptoms. 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 Past Medical History/Comorbidities:   Mr. Poppy Miranda  has no past medical history on file. Mr. Poppy Miranda  has no past surgical history on file. Social History/Living Environment:     Pt lives with spouse/significant other in [facility]. Prior Level of Function/Work/Activity:  Pt works full time on farm. Pt had unrestricted prior level of function.    Current Medications:     No current outpatient medications on file. Diclofenac 2/day. Date Last Reviewed:  9/30/2020    Number of Personal Factors/Comorbidities that affect the Plan of Care: 0: LOW COMPLEXITY   EXAMINATION:   Pain at worst: 5/10  Observation: Unremarkable  ROM:    Left Right   Elevation (°) 150  154   Behind neck reach To upper back To upper back   Behind back reach To TLJ  To TLJ   Cross body reach To opposite scapula To opposite scapula   Pt reports mild pain with L abduction. UE ANDT: All within normal limits B  Strength: 5/5 throughout for B arms except 4/5 L ER which also is painful. Palpation: no TTP throughout L shoulder   Body Structures Involved:  1. Joints  2. Muscles Body Functions Affected:  1. Sensory/Pain  2. Neuromusculoskeletal  3. Movement Related Activities and Participation Affected:  1. General Tasks and Demands  2. Self Care  3. Community, Social and [place]   Number of elements (examined above) that affect the Plan of Care: 1-2: LOW COMPLEXITY   CLINICAL PRESENTATION:   Presentation: Stable and uncomplicated: LOW COMPLEXITY     CLINICAL DECISION MAKING:      OUTCOME MEASURE:   Initial outcome measure performed on 9/30/2020. Tool Used: Disabilities of the Arm, Shoulder and Hand (DASH) Questionnaire - Quick Version  Score:  Initial: 29/55  Most Recent: X/55 (Date: -- )   Interpretation of Score: The DASH is designed to measure the activities of daily living in person's with upper extremity dysfunction or pain. Each section is scored on a 1-5 scale, 5 representing the greatest disability. The scores of each section are added together for a total score of 55. MEDICAL NECESSITY:   · Patient will benefit from skilled physical therapy to address their previously listed impairments in order to improve their independence with functional activities painfree. REASON FOR SERVICES/OTHER COMMENTS:  · Patient requires present interventions due to patient's inability to perform functional and recreational activities painfree. Use of outcome tool(s) and clinical judgement create a POC that gives a: Clear prediction of patient's progress: LOW COMPLEXITY        Total Duration: 45 min  PT Patient Time In/Time Out  Time In: 6749  Time Out: [address], PT, DPT

## 2020-09-30 NOTE — PROGRESS NOTES
Jonna Andrea  : 1965  Primary: Madhu Alvares  [facility]  [facility] [place] [address].  [phone]   [phone]      OUTPATIENT PHYSICAL THERAPY: Daily Treatment Note 2020  Visit Count:  1    ICD-10: Treatment Diagnosis: Pain in left shoulder (M25.512) and Stiffness of left shoulder, not elsewhere classified (M25.612)  TREATMENT PLAN:  Effective Dates: 2020 TO 10/30/2020 (30 days). Frequency/Duration: 2 times a week for 30 Day(s)  GOALS: (Goals have been discussed and agreed upon with patient.)  Short-Term Functional Goals: Time Frame: 2 weeks  # Goal Status   1 Pt will confirm compliance with home program to facilitate improvement in function. 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 Pain: Initial:   0/10 Post Session:  0/10   Medications Last Reviewed: 2020  Updated Objective Findings: Below measures from initial evaluation unless otherwise noted. 20  Pain at worst: 5/10  Observation: Unremarkable  ROM:    Left Right   Elevation (°) 150  154   Behind neck reach To upper back To upper back   Behind back reach To TLJ  To TLJ   Cross body reach To opposite scapula To opposite scapula   Pt reports mild pain with L abduction. UE ANDT: All within normal limits B  Strength: 5/5 throughout for B arms except 4/5 L ER which also is painful. Palpation: no TTP throughout L shoulder  DASH:     TREATMENT:   THERAPEUTIC ACTIVITY: (see chart for minutes): Therapeutic activities per grid below to improve mobility, strength, balance and coordination.   Required minimal visual, verbal and tactile cues to improve independence with functional mobility and activities. THERAPEUTIC EXERCISE: (see chart for minutes):  Exercises per grid below to improve mobility, strength, balance and coordination. Required minimal visual, verbal and tactile cues to promote proper body alignment and promote proper body mechanics. Progressed resistance, range, repetitions and complexity of movement as indicated. NEUROMUSCULAR RE-EDUCATION: (see chart for minutes):  Exercise/activities per grid below to improve balance, coordination, kinesthetic sense, posture, pain, and proprioception. Required minimal visual, verbal and tactile cues to promote motor control of left shoulder. MANUAL THERAPY: (see chart for minutes): Joint mobilization, Soft tissue mobilization, skin mobilization, and muscle energy techniques were utilized and necessary because of the patient's restricted joint motion, restricted motion of soft tissue and pain . MODALITIES: (see chart for minutes):      *  Cold Pack Therapy in order to provide analgesia. Date: 9/30/20 (visit 1)       Modalities:                Therapeutic Exercise: 25 min        Band ER: x5 B blue  Band pull-apart: x5 B blue  Band D2 flexion: x5 B blue  Pt educated on S/L sleeping using pillows for support and trialed in clinic. He reported it felt good. Pt educated on monitoring activity level and how to gradually increase activity tolerance. Pt educated on home program implementation and given printout. Neuromuscular re-education                Manual Therapy:                Therapeutic Activities:                  Home program: 9/30/20: Band ER, pull-apart, and D2 flexion    [employer] Portal  Treatment/Session Summary:    · Response to Treatment: Pt tolerated exercises well and that he could feel them working.   · Communication/Consultation:  None today  · Equipment provided today: Blue band  · Recommendations/Intent for next treatment session: Next visit will focus on improving L shoulder strength and activity tolerance.     Total Treatment Billable Duration:  45 minutes  PT Patient Time In/Time Out  Time In: 7100  Time Out: [address] [address] Cee Nap, PT, DPT    Future Appointments   Date Time Provider Kaylie Garner   10/2/2020  3:30 PM Gara Radha [facility]   10/6/2020  2:45 PM Gara Radha [facility]   10/8/2020  2:45 PM Gara Radha [facility]   10/13/2020  2:45 PM Gara Radha [facility]   10/15/2020  2:45 PM Gara Radha [facility]   10/20/2020  2:45 PM Gara Radha [facility]   10/22/2020  2:45 PM Gara Radha [facility]   10/27/2020  2:45 PM Gara Radha [facility]   10/29/2020  2:45 PM Gara Radha [facility]

## 2020-10-02 ENCOUNTER — HOSPITAL ENCOUNTER (OUTPATIENT)
Dept: PHYSICAL THERAPY | Age: 55
Discharge: HOME | End: 2020-10-02
Payer: COMMERCIAL

## 2020-10-02 PROCEDURE — 97110 THERAPEUTIC EXERCISES: CPT

## 2020-10-02 PROCEDURE — 97530 THERAPEUTIC ACTIVITIES: CPT

## 2020-10-02 NOTE — PROGRESS NOTES
Lakisha Amin  : 1965  Primary: Marcelina Alvares  Secondary:  [address] @ [address], [address] [place] [address]  [phone]   [phone]      OUTPATIENT PHYSICAL THERAPY: Daily Treatment Note 10/2/2020  Visit Count:  2    ICD-10: Treatment Diagnosis: Pain in left shoulder (M25.512) and Stiffness of left shoulder, not elsewhere classified (M25.612)  TREATMENT PLAN:  Effective Dates: 2020 TO 10/30/2020 (30 days). Frequency/Duration: 2 times a week for 30 Day(s)  GOALS: (Goals have been discussed and agreed upon with patient.)  Short-Term Functional Goals: Time Frame: 2 weeks  # Goal Status   1 Pt will confirm compliance with home program to facilitate improvement in function. 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 He reports sleep is still difficult. Pain: Initial:   0/10 Post Session:  0/10   Medications Last Reviewed: 10/2/2020  Updated Objective Findings: Below measures from initial evaluation unless otherwise noted. 20  Pain at worst: 5/10  Observation: Unremarkable  ROM:    Left Right   Elevation (°) 150  154   Behind neck reach To upper back To upper back   Behind back reach To TLJ  To TLJ   Cross body reach To opposite scapula To opposite scapula   Pt reports mild pain with L abduction. UE ANDT: All within normal limits B  Strength: 5/5 throughout for B arms except 4/5 L ER which also is painful. Palpation: no TTP throughout L shoulder  DASH:     TREATMENT:   THERAPEUTIC ACTIVITY: (see chart for minutes): Therapeutic activities per grid below to improve mobility, strength, balance and coordination.   Required minimal visual, verbal and tactile cues to improve independence with functional mobility and activities. THERAPEUTIC EXERCISE: (see chart for minutes):  Exercises per grid below to improve mobility, strength, balance and coordination. Required minimal visual, verbal and tactile cues to promote proper body alignment and promote proper body mechanics. Progressed resistance, range, repetitions and complexity of movement as indicated. NEUROMUSCULAR RE-EDUCATION: (see chart for minutes):  Exercise/activities per grid below to improve balance, coordination, kinesthetic sense, posture, pain, and proprioception. Required minimal visual, verbal and tactile cues to promote motor control of left shoulder. MANUAL THERAPY: (see chart for minutes): Joint mobilization, Soft tissue mobilization, skin mobilization, and muscle energy techniques were utilized and necessary because of the patient's restricted joint motion, restricted motion of soft tissue and pain . MODALITIES: (see chart for minutes):      *  Cold Pack Therapy in order to provide analgesia. Date: 9/30/20 (visit 1) 10/2/20 (visit 2)       Modalities:                Therapeutic Exercise: 25 min 35 min       Band ER: x5 B blue  Band pull-apart: x5 B blue  Band D2 flexion: x5 B blue  Pt educated on S/L sleeping using pillows for support and trialed in clinic. He reported it felt good. Pt educated on monitoring activity level and how to gradually increase activity tolerance. Pt educated on home program implementation and given printout. UBE: 3'/3' L4  Band ER: 2x10 B blue  Band pull-apart: 2x10 B blue  Band D2 flexion: x10 B blue  Pt educated on S/L sleeping using pillows for support again per his request.   Bird dog: x5 B, no pain  Side plank: x5 from knees with no pain.  2x5 from feet which pt reported was more challenging   Full contact twist: 2x6x55# B      Neuromuscular re-education                Manual Therapy:                Therapeutic Activities:  10 min        Forward 1-arm 4# ball throw: x6 B 50% effort, 6x70% effort, 6x80% effort no pain  Side wall throw: x8 with 8# ball B  Overhead slam: 2x6 with 8# ball        Home program: 9/30/20: Band ER, pull-apart, and D2 flexion    [employer] Portal  Treatment/Session Summary:    · Response to Treatment: Pt tolerated exercises and activities well. He reported no pain with ball throwing activities. He was educated to start working on punching drills over the weekend, starting lightly ~50% effort. · Communication/Consultation:  None today  · Equipment provided today: Blue band  · Recommendations/Intent for next treatment session: Next visit will focus on improving L shoulder strength and activity tolerance.     Total Treatment Billable Duration:  45 minutes  PT Patient Time In/Time Out  Time In: 7212  Time Out: [address] [facility] Ja Brunner, PT, DPT    Future Appointments   Date Time Provider Kaylie Garner   10/6/2020  2:45 PM Rebol[place]o Sauce [facility]   10/8/2020  2:45 PM [facility] Sauce [facility]   10/13/2020  2:45 PM [place] [facility]   10/15/2020  2:45 PM [place] Sauce [facility]   10/20/2020  2:45 PM [facility]   10/22/2020  2:45 PM [place] Sauce [facility]   10/27/2020  2:45 PM [facility] [facility]   10/29/2020  2:45 PM [place] [facility]

## 2020-10-06 ENCOUNTER — HOSPITAL ENCOUNTER (OUTPATIENT)
Dept: PHYSICAL THERAPY | Age: 55
Discharge: HOME | End: 2020-10-06
Payer: COMMERCIAL

## 2020-10-06 PROCEDURE — 97110 THERAPEUTIC EXERCISES: CPT

## 2020-10-06 NOTE — PROGRESS NOTES
Eileen Juarez  : 1965  Primary: Miguel Alvares  Secondary:  Marah Monzon @ [place] [address].  [phone]   [phone]      OUTPATIENT PHYSICAL THERAPY: Daily Treatment Note 10/6/2020  Visit Count:  3    ICD-10: Treatment Diagnosis: Pain in left shoulder (M25.512) and Stiffness of left shoulder, not elsewhere classified (M25.612)  TREATMENT PLAN:  Effective Dates: 2020 TO 10/30/2020 (30 days). Frequency/Duration: 2 times a week for 30 Day(s)  GOALS: (Goals have been discussed and agreed upon with patient.)  Short-Term Functional Goals: Time Frame: 2 weeks  # Goal Status   1 Pt will confirm compliance with home program to facilitate improvement in function. 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 He reports sleep is still not normal yet and he is going to rent a recliner to see if that helps. Pain: Initial:   0/10 Post Session:  0/10   Medications Last Reviewed: 10/6/2020  Updated Objective Findings: Below measures from initial evaluation unless otherwise noted. 20  Pain at worst: 5/10  Observation: Unremarkable  ROM:    Left Right   Elevation (°) 150  154   Behind neck reach To upper back To upper back   Behind back reach To TLJ  To TLJ   Cross body reach To opposite scapula To opposite scapula   Pt reports mild pain with L abduction. UE ANDT: All within normal limits B  Strength: 5/5 throughout for B arms except 4/5 L ER which also is painful. Palpation: no TTP throughout L shoulder  DASH:     TREATMENT:   THERAPEUTIC ACTIVITY: (see chart for minutes):   Therapeutic activities per grid below to improve mobility, strength, balance and coordination. Required minimal visual, verbal and tactile cues to improve independence with functional mobility and activities. THERAPEUTIC EXERCISE: (see chart for minutes):  Exercises per grid below to improve mobility, strength, balance and coordination. Required minimal visual, verbal and tactile cues to promote proper body alignment and promote proper body mechanics. Progressed resistance, range, repetitions and complexity of movement as indicated. NEUROMUSCULAR RE-EDUCATION: (see chart for minutes):  Exercise/activities per grid below to improve balance, coordination, kinesthetic sense, posture, pain, and proprioception. Required minimal visual, verbal and tactile cues to promote motor control of left shoulder. MANUAL THERAPY: (see chart for minutes): Joint mobilization, Soft tissue mobilization, skin mobilization, and muscle energy techniques were utilized and necessary because of the patient's restricted joint motion, restricted motion of soft tissue and pain . MODALITIES: (see chart for minutes):      *  Cold Pack Therapy in order to provide analgesia. Date: 9/30/20 (visit 1) 10/2/20 (visit 2)  10/6/20 (visit 3)      Modalities:                Therapeutic Exercise: 25 min 35 min 42 min      Band ER: x5 B blue  Band pull-apart: x5 B blue  Band D2 flexion: x5 B blue  Pt educated on S/L sleeping using pillows for support and trialed in clinic. He reported it felt good. Pt educated on monitoring activity level and how to gradually increase activity tolerance. Pt educated on home program implementation and given printout. UBE: 3'/3' L4  Band ER: 2x10 B blue  Band pull-apart: 2x10 B blue  Band D2 flexion: x10 B blue  Pt educated on S/L sleeping using pillows for support again per his request.   Bird dog: x5 B, no pain  Side plank: x5 from knees with no pain.  2x5 from feet which pt reported was more challenging   Full contact twist: 2x6x55# B UBE: 3'/3' L4  Band ER: 2x10 B blue  Band pull-apart: 2x10 B blue  Band ER in abduction: 2x10 B red  DB flexion: 2x10x4# B  DB abduction: 2x10x4# B, no pain  Side plank from hand: x3 B, x5 with reaching   Push-ups: x6 no pain  Shoulder taps in PU position: x5 B  Pt educated to start working on PU and pull-ups again at home and gradually increasing intensity of boxing drills. Neuromuscular re-education                Manual Therapy:                Therapeutic Activities:  10 min        Forward 1-arm 4# ball throw: x6 B 50% effort, 6x70% effort, 6x80% effort no pain  Side wall throw: x8 with 8# ball B  Overhead slam: 2x6 with 8# ball        Home program: 9/30/20: Band ER, pull-apart, and D2 flexion    MedBridge Portal  Treatment/Session Summary:    · Response to Treatment: Pt reported no symptoms with exercises except for less steadiness on L compared to R.   · Communication/Consultation:  None today  · Equipment provided today: Blue band  · Recommendations/Intent for next treatment session: Next visit will focus on improving L shoulder strength and activity tolerance.     Total Treatment Billable Duration:  42 minutes  PT Patient Time In/Time Out  Time In: 8718  Time Out: George Gray PT, DPT    Future Appointments   Date Time Provider Kaylie Garner   10/8/2020  2:45 PM Ardath Guy [facility]   10/13/2020  2:45 PM Ardath Guy [facility]   10/15/2020  2:45 PM [facility]   10/20/2020  2:45 PM Ardath Guy [facility]   10/22/2020  2:45 PM Ardath Guy [facility]   10/27/2020  2:45 PM [facility]   10/29/2020  2:45 PM [facility]

## 2020-10-08 ENCOUNTER — HOSPITAL ENCOUNTER (OUTPATIENT)
Dept: PHYSICAL THERAPY | Age: 55
Discharge: HOME | End: 2020-10-08
Payer: COMMERCIAL

## 2020-10-08 PROCEDURE — 97110 THERAPEUTIC EXERCISES: CPT

## 2020-10-08 NOTE — PROGRESS NOTES
Conner Hernandez  : 1965  Primary: Delma Alvares  Secondary:  [address] [facility] @ [address] [place] [address], [address] [place] [address]  [phone]   [phone]      OUTPATIENT PHYSICAL THERAPY: Daily Treatment Note 10/8/2020  Visit Count:  4    ICD-10: Treatment Diagnosis: Pain in left shoulder (M25.512) and Stiffness of left shoulder, not elsewhere classified (M25.612)  TREATMENT PLAN:  Effective Dates: 2020 TO 10/30/2020 (30 days). Frequency/Duration: 2 times a week for 30 Day(s)  GOALS: (Goals have been discussed and agreed upon with patient.)  Short-Term Functional Goals: Time Frame: 2 weeks  # Goal Status   1 Pt will confirm compliance with home program to facilitate improvement in function. 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 He reports he slept on his L shoulder last night so his shoulder is a little tender today but he was able to get a few hours of continuous sleep that way. Pain: Initial:   0/10 Post Session:  0/10   Medications Last Reviewed: 10/8/2020  Updated Objective Findings: Below measures from initial evaluation unless otherwise noted. 20  Pain at worst: 5/10  Observation: Unremarkable  ROM:    Left Right   Elevation (°) 150  154   Behind neck reach To upper back To upper back   Behind back reach To TLJ  To TLJ   Cross body reach To opposite scapula To opposite scapula   Pt reports mild pain with L abduction. UE ANDT: All within normal limits B  Strength: 5/5 throughout for B arms except 4/5 L ER which also is painful.    Palpation: no TTP throughout L shoulder  DASH:     TREATMENT:   THERAPEUTIC ACTIVITY: (see chart for minutes): Therapeutic activities per grid below to improve mobility, strength, balance and coordination. Required minimal visual, verbal and tactile cues to improve independence with functional mobility and activities. THERAPEUTIC EXERCISE: (see chart for minutes):  Exercises per grid below to improve mobility, strength, balance and coordination. Required minimal visual, verbal and tactile cues to promote proper body alignment and promote proper body mechanics. Progressed resistance, range, repetitions and complexity of movement as indicated. NEUROMUSCULAR RE-EDUCATION: (see chart for minutes):  Exercise/activities per grid below to improve balance, coordination, kinesthetic sense, posture, pain, and proprioception. Required minimal visual, verbal and tactile cues to promote motor control of left shoulder. MANUAL THERAPY: (see chart for minutes): Joint mobilization, Soft tissue mobilization, skin mobilization, and muscle energy techniques were utilized and necessary because of the patient's restricted joint motion, restricted motion of soft tissue and pain . MODALITIES: (see chart for minutes):      *  Cold Pack Therapy in order to provide analgesia. Date: 9/30/20 (visit 1) 10/2/20 (visit 2)  10/6/20 (visit 3)  10/8/20 (visit 4)     Modalities:                Therapeutic Exercise: 25 min 35 min 42 min 40 min     Band ER: x5 B blue  Band pull-apart: x5 B blue  Band D2 flexion: x5 B blue  Pt educated on S/L sleeping using pillows for support and trialed in clinic. He reported it felt good. Pt educated on monitoring activity level and how to gradually increase activity tolerance. Pt educated on home program implementation and given printout. UBE: 3'/3' L4  Band ER: 2x10 B blue  Band pull-apart: 2x10 B blue  Band D2 flexion: x10 B blue  Pt educated on S/L sleeping using pillows for support again per his request.   Bird dog: x5 B, no pain  Side plank: x5 from knees with no pain.  2x5 from feet which pt reported was more challenging   Full contact twist: 2x6x55# B UBE: 3'/3' L4  Band ER: 2x10 B blue  Band pull-apart: 2x10 B blue  Band ER in abduction: 2x10 B red  DB flexion: 2x10x4# B  DB abduction: 2x10x4# B, no pain  Side plank from hand: x3 B, x5 with reaching   Push-ups: x6 no pain  Shoulder taps in PU position: x5 B  Pt educated to start working on PU and pull-ups again at home and gradually increasing intensity of boxing drills. UBE: 3'/3' L4  Band ER: 2x10 B black  Band pull-apart: 2x10 B black  Band D2 flexion: x10 B black  DB flexion: 2x10x5# B  DB abduction: 2x10x5# B with improved ease   Side plank from hand: x5 with reaching   Push-ups on BOSU: x8  PU on medicine ball alternating hands: x3 B  Shoulder stretch around head using medicine ball: x6 B    Neuromuscular re-education                Manual Therapy:                Therapeutic Activities:  10 min        Forward 1-arm 4# ball throw: x6 B 50% effort, 6x70% effort, 6x80% effort no pain  Side wall throw: x8 with 8# ball B  Overhead slam: 2x6 with 8# ball        Home program: 9/30/20: Band ER, pull-apart, and D2 flexion    MedBridge Portal  Treatment/Session Summary:    · Response to Treatment: Pt tolerated exercises well today and reported he can feel the strength coming back into his L shoulder. · Communication/Consultation:  None today  · Equipment provided today: Blue band  · Recommendations/Intent for next treatment session: Next visit will focus on improving L shoulder strength and activity tolerance. Add in plyometric and power exercises, trial thoracic bridge, and suspension .      Total Treatment Billable Duration:  40 minutes  PT Patient Time In/Time Out  Time In: 7155  Time Out: Margaux Clemente 97, PT, DPT    Future Appointments   Date Time Provider Kaylie Garner   10/13/2020  2:45 PM Gareth Hernandez [facility]   10/15/2020  2:45 PM Garethflorin [facility]   10/20/2020  2:45 PM Gareth [facility]   10/22/2020  [facility]   10/27/2020  2:45 PM Bertram Kimball [facility]   10/29/2020  2:45 PM Bertram [facility]

## 2020-10-13 ENCOUNTER — HOSPITAL ENCOUNTER (OUTPATIENT)
Dept: PHYSICAL THERAPY | Age: 55
Discharge: HOME | End: 2020-10-13
Payer: COMMERCIAL

## 2020-10-13 PROCEDURE — 97110 THERAPEUTIC EXERCISES: CPT

## 2020-10-13 NOTE — PROGRESS NOTES
Brian Trinidad  : 1965  Primary: Kenney Alvares  Secondary:  [address] [facility] [address] @ [address]  [facility] [address]  [phone]   [phone]      OUTPATIENT PHYSICAL THERAPY: Daily Treatment Note 10/13/2020  Visit Count:  5    ICD-10: Treatment Diagnosis: Pain in left shoulder (M25.512) and Stiffness of left shoulder, not elsewhere classified (M25.612)  TREATMENT PLAN:  Effective Dates: 2020 TO 10/30/2020 (30 days). Frequency/Duration: 2 times a week for 30 Day(s)  GOALS: (Goals have been discussed and agreed upon with patient.)  Short-Term Functional Goals: Time Frame: 2 weeks  # Goal Status   1 Pt will confirm compliance with home program to facilitate improvement in function. 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 He reports he sleeps about half the night through now. He reports he hasn't had pain in arm though when he was lifting a heavy bag up, he did notice that L shoulder was weaker than R.   Pain: Initial:   0/10 Post Session:  0/10   Medications Last Reviewed: 10/13/2020  Updated Objective Findings: Below measures from initial evaluation unless otherwise noted. 20  Pain at worst: 5/10  Observation: Unremarkable  ROM:    Left Right   Elevation (°) 150  154   Behind neck reach To upper back To upper back   Behind back reach To TLJ  To TLJ   Cross body reach To opposite scapula To opposite scapula   Pt reports mild pain with L abduction. UE ANDT: All within normal limits B  Strength: 5/5 throughout for B arms except 4/5 L ER which also is painful.    Palpation: no TTP throughout L shoulder  DASH:     TREATMENT:   THERAPEUTIC ACTIVITY: (see chart for minutes): Therapeutic activities per grid below to improve mobility, strength, balance and coordination. Required minimal visual, verbal and tactile cues to improve independence with functional mobility and activities. THERAPEUTIC EXERCISE: (see chart for minutes):  Exercises per grid below to improve mobility, strength, balance and coordination. Required minimal visual, verbal and tactile cues to promote proper body alignment and promote proper body mechanics. Progressed resistance, range, repetitions and complexity of movement as indicated. NEUROMUSCULAR RE-EDUCATION: (see chart for minutes):  Exercise/activities per grid below to improve balance, coordination, kinesthetic sense, posture, pain, and proprioception. Required minimal visual, verbal and tactile cues to promote motor control of left shoulder. MANUAL THERAPY: (see chart for minutes): Joint mobilization, Soft tissue mobilization, skin mobilization, and muscle energy techniques were utilized and necessary because of the patient's restricted joint motion, restricted motion of soft tissue and pain . MODALITIES: (see chart for minutes):      *  Cold Pack Therapy in order to provide analgesia. Date: 9/30/20 (visit 1) 10/2/20 (visit 2)  10/6/20 (visit 3)  10/8/20 (visit 4)  10/13/20 (visit 5)    Modalities:                Therapeutic Exercise: 25 min 35 min 42 min 40 min 50 min    Band ER: x5 B blue  Band pull-apart: x5 B blue  Band D2 flexion: x5 B blue  Pt educated on S/L sleeping using pillows for support and trialed in clinic. He reported it felt good. Pt educated on monitoring activity level and how to gradually increase activity tolerance. Pt educated on home program implementation and given printout.  UBE: 3'/3' L4  Band ER: 2x10 B blue  Band pull-apart: 2x10 B blue  Band D2 flexion: x10 B blue  Pt educated on S/L sleeping using pillows for support again per his request.   Bird dog: x5 B, no pain  Side plank: x5 from knees with no pain. 2x5 from feet which pt reported was more challenging   Full contact twist: 2x6x55# B UBE: 3'/3' L4  Band ER: 2x10 B blue  Band pull-apart: 2x10 B blue  Band ER in abduction: 2x10 B red  DB flexion: 2x10x4# B  DB abduction: 2x10x4# B, no pain  Side plank from hand: x3 B, x5 with reaching   Push-ups: x6 no pain  Shoulder taps in PU position: x5 B  Pt educated to start working on PU and pull-ups again at home and gradually increasing intensity of boxing drills. UBE: 3'/3' L4  Band ER: 2x10 B black  Band pull-apart: 2x10 B black  Band D2 flexion: x10 B black  DB flexion: 2x10x5# B  DB abduction: 2x10x5# B with improved ease   Side plank from hand: x5 with reaching   Push-ups on BOSU: x8  PU on medicine ball alternating hands: x3 B  Shoulder stretch around head using medicine ball: x6 B UBE: 3'/3' L4   Around the world with 20# KB: x5 B  TRX Push-up: 2x5  TRX pike: 2x6   Thoracic bridge: 2x2 B, pt reported this was challenging to L shoulder. Clapper push-up: x6, no pain  KB OP press: 8x20# B  Barbell curl: 2x8x65#  Pt educated on adding thoracic bridge and trying out other movements at home. Neuromuscular re-education                Manual Therapy:                Therapeutic Activities:  10 min        Forward 1-arm 4# ball throw: x6 B 50% effort, 6x70% effort, 6x80% effort no pain  Side wall throw: x8 with 8# ball B  Overhead slam: 2x6 with 8# ball        Home program: 9/30/20: Band ER, pull-apart, and D2 flexion  10/13/20: thoracic bridge    MedBridge Portal  Treatment/Session Summary:    · Response to Treatment: Pt progressing well and reported no pain with exercises except initial discomfort with thoracic bridge. · Communication/Consultation:  None today  · Equipment provided today: Blue band  · Recommendations/Intent for next treatment session: Next visit will focus on improving L shoulder strength and activity tolerance.     Total Treatment Billable Duration:  50 minutes  PT Patient Time In/Time Out  Time In: 5154  Time Out: 3980 Yovany Singh Reap, PT, DPT    Future Appointments   Date Time Provider Kaylie Garner   10/15/2020  2:45 PM Belenda Railing [facility]   10/20/2020  2:45 PM Belenda Railing [facility]   10/22/2020  2:45 PM Belenda Railing [facility]   10/27/2020  2:45 PM Belenda Railing [facility]   10/29/2020  2:45 PM Belenda Railing [facility]

## 2020-10-15 ENCOUNTER — HOSPITAL ENCOUNTER (OUTPATIENT)
Dept: PHYSICAL THERAPY | Age: 55
Discharge: HOME | End: 2020-10-15
Payer: COMMERCIAL

## 2020-10-15 PROCEDURE — 97110 THERAPEUTIC EXERCISES: CPT

## 2020-10-15 NOTE — PROGRESS NOTES
Wes Alvarado  : 1965  Primary: Olena Alvares  Secondary:  Li Guillory @ [address] [place] [address].  [phone]   [phone]      OUTPATIENT PHYSICAL THERAPY: Daily Treatment Note 10/15/2020  Visit Count:  6    ICD-10: Treatment Diagnosis: Pain in left shoulder (M25.512) and Stiffness of left shoulder, not elsewhere classified (M25.612)  TREATMENT PLAN:  Effective Dates: 2020 TO 10/30/2020 (30 days). Frequency/Duration: 2 times a week for 30 Day(s)  GOALS: (Goals have been discussed and agreed upon with patient.)  Short-Term Functional Goals: Time Frame: 2 weeks  # Goal Status   1 Pt will confirm compliance with home program to facilitate improvement in function. 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 He reports he was able to do exercises and work without pain though he does notice mild pain with abduction first thing in the morning with putting on deodorant. Pain: Initial:   0/10 Post Session:  0/10   Medications Last Reviewed: 10/15/2020  Updated Objective Findings: Below measures from initial evaluation unless otherwise noted. 20  Pain at worst: 5/10  Observation: Unremarkable  ROM:    Left Right   Elevation (°) 150  154   Behind neck reach To upper back To upper back   Behind back reach To TLJ  To TLJ   Cross body reach To opposite scapula To opposite scapula   Pt reports mild pain with L abduction. UE ANDT: All within normal limits B  Strength: 5/5 throughout for B arms except 4/5 L ER which also is painful.    Palpation: no TTP throughout L shoulder  DASH:     TREATMENT:   THERAPEUTIC ACTIVITY: (see chart for minutes): Therapeutic activities per grid below to improve mobility, strength, balance and coordination. Required minimal visual, verbal and tactile cues to improve independence with functional mobility and activities. THERAPEUTIC EXERCISE: (see chart for minutes):  Exercises per grid below to improve mobility, strength, balance and coordination. Required minimal visual, verbal and tactile cues to promote proper body alignment and promote proper body mechanics. Progressed resistance, range, repetitions and complexity of movement as indicated. NEUROMUSCULAR RE-EDUCATION: (see chart for minutes):  Exercise/activities per grid below to improve balance, coordination, kinesthetic sense, posture, pain, and proprioception. Required minimal visual, verbal and tactile cues to promote motor control of left shoulder. MANUAL THERAPY: (see chart for minutes): Joint mobilization, Soft tissue mobilization, skin mobilization, and muscle energy techniques were utilized and necessary because of the patient's restricted joint motion, restricted motion of soft tissue and pain . MODALITIES: (see chart for minutes):      *  Cold Pack Therapy in order to provide analgesia. Date: 10/13/20 (visit 5) 10/15/20 (visit 6)       Modalities:                Therapeutic Exercise: 50 min 40 min       UBE: 3'/3' L4   Around the world with 20# KB: x5 B  TRX Push-up: 2x5  TRX pike: 2x6   Thoracic bridge: 2x2 B, pt reported this was challenging to L shoulder. Clapper push-up: x6, no pain  KB OP press: 8x20# B  Barbell curl: 2x8x65#  Pt educated on adding thoracic bridge and trying out other movements at home.      Around the world with 20# KB: x5 B  OP: 10x65#   Push press: [id number]#   Thoracic bridge: 2x2 B   Curls with KB: 2x8x20# B, supinated, pronated, and neutral. Pt reported pronated  he could feel in L shoulder  KB swinx6x20#   KB clean: 5x20# B   Throw 2# ball into trampoline: 3x10 L  [place] press: 6x10# in each hand Neuromuscular re-education                Manual Therapy:                Therapeutic Activities:                  Home program: 9/30/20: Band ER, pull-apart, and D2 flexion  10/13/20: thoracic bridge    MedBridge Portal  Treatment/Session Summary:    · Response to Treatment: Pt continues to improve his functional use of L UE and he reported throwing ball and overhead press felt good. He was educated to add weight to his training dummy to continue increasing his tolerance. · Communication/Consultation:  None today  · Equipment provided today: Blue band  · Recommendations/Intent for next treatment session: Next visit will focus on improving L shoulder strength and activity tolerance.     Total Treatment Billable Duration:  40 minutes  PT Patient Time In/Time Out  Time In: 1390  Time Out: Margaux Clemente 97, PT, DPT    Future Appointments   Date Time Provider Kaylie Garner   10/20/2020  2:45 PM Sharon Weir [facility]   10/22/2020  2:45 PM Sharon Hobson [facility]   10/27/2020  2:45 PM Sharon CLEMENTE [facility]   10/29/2020  2:45 PM Sharon CLEMENTE [facility]

## 2020-10-20 ENCOUNTER — HOSPITAL ENCOUNTER (OUTPATIENT)
Dept: PHYSICAL THERAPY | Age: 55
Discharge: HOME | End: 2020-10-20
Payer: COMMERCIAL

## 2020-10-22 ENCOUNTER — HOSPITAL ENCOUNTER (OUTPATIENT)
Dept: PHYSICAL THERAPY | Age: 55
Discharge: HOME | End: 2020-10-22
Payer: COMMERCIAL

## 2020-10-22 PROCEDURE — 97110 THERAPEUTIC EXERCISES: CPT

## 2020-10-22 NOTE — PROGRESS NOTES
Lakisha Amin  : 1965  Primary: Marcelina Alvares  Secondary:  [address] @ [address], [place] [address].  [phone]   [phone]      OUTPATIENT PHYSICAL THERAPY: Daily Treatment Note 10/22/2020  Visit Count:  7    ICD-10: Treatment Diagnosis: Pain in left shoulder (M25.512) and Stiffness of left shoulder, not elsewhere classified (M25.612)  TREATMENT PLAN:  Effective Dates: 2020 TO 10/30/2020 (30 days). Frequency/Duration: 2 times a week for 30 Day(s)  GOALS: (Goals have been discussed and agreed upon with patient.)  Short-Term Functional Goals: Time Frame: 2 weeks  # Goal Status   1 Pt will confirm compliance with home program to facilitate improvement in function. 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 He reports he has not been as active with shoulder exercises since he had a death in the family and a  to go to earlier this week, but over the weekend, he was able to do more with it. He also reports that last night he had 6.5 hours of sleep which is the most he has had since his shoulder started hurting. He reports he did several things such as as taking melatonin, going bear hunting which was a lot of activity, and he thinks PT in general is helping. Pain: Initial:   0/10 Post Session:  0/10   Medications Last Reviewed: 10/22/2020  Updated Objective Findings: Below measures from initial evaluation unless otherwise noted.   20  Pain at worst: 5/10  Observation: Unremarkable  ROM:    Left Right   Elevation (°) 150  154   Behind neck reach To upper back To upper back   Behind back reach To TLJ  To TLJ   Cross body reach To opposite scapula To opposite scapula   Pt reports mild pain with L abduction. UE ANDT: All within normal limits B  Strength: 5/5 throughout for B arms except 4/5 L ER which also is painful. Palpation: no TTP throughout L shoulder  DASH: 29/55    TREATMENT:   THERAPEUTIC ACTIVITY: (see chart for minutes): Therapeutic activities per grid below to improve mobility, strength, balance and coordination. Required minimal visual, verbal and tactile cues to improve independence with functional mobility and activities. THERAPEUTIC EXERCISE: (see chart for minutes):  Exercises per grid below to improve mobility, strength, balance and coordination. Required minimal visual, verbal and tactile cues to promote proper body alignment and promote proper body mechanics. Progressed resistance, range, repetitions and complexity of movement as indicated. NEUROMUSCULAR RE-EDUCATION: (see chart for minutes):  Exercise/activities per grid below to improve balance, coordination, kinesthetic sense, posture, pain, and proprioception. Required minimal visual, verbal and tactile cues to promote motor control of left shoulder. MANUAL THERAPY: (see chart for minutes): Joint mobilization, Soft tissue mobilization, skin mobilization, and muscle energy techniques were utilized and necessary because of the patient's restricted joint motion, restricted motion of soft tissue and pain . MODALITIES: (see chart for minutes):      *  Cold Pack Therapy in order to provide analgesia. Date: 10/13/20 (visit 5) 10/15/20 (visit 6)  10/22/20 (visit 7)      Modalities:                Therapeutic Exercise: 50 min 40 min 40 min      UBE: 3'/3' L4   Around the world with 20# KB: x5 B  TRX Push-up: 2x5  TRX pike: 2x6   Thoracic bridge: 2x2 B, pt reported this was challenging to L shoulder. Clapper push-up: x6, no pain  KB OP press: 8x20# B  Barbell curl: 2x8x65#  Pt educated on adding thoracic bridge and trying out other movements at home.      Around the world with 20# KB: x5 B  OP: 10x65#   Push press: [id number]# Thoracic bridge: 2x2 B   Curls with KB: 2x8x20# B, supinated, pronated, and neutral. Pt reported pronated  he could feel in L shoulder  KB swinx6x20#   KB clean: 5x20# B   Throw 2# ball into trampoline: 3x10 L  [place] press: 6x10# in each hand  Around the world with 20# KB: x5 B  PP: 10x65#, 10x95#   KB overhead press: [id number]#, second set bell up  Thoracic bridge: 2x2 B   DB Curls: 0m79h41# B, supinated, pronated, and neutral, no pain. Throw 2# ball into trampoline: 2x10 L  Barbell row: 10x115#   Bear crawl: 1 lap forwards and backwards      Neuromuscular re-education                Manual Therapy:                Therapeutic Activities:                  Home program: 20: Band ER, pull-apart, and D2 flexion  10/13/20: thoracic bridge    Med[facility] Portal  Treatment/Session Summary:    · Response to Treatment: Pt tolerated all exercises well. He was educated to continue working on his boxing drills with Krissy [place] dummy as well as practicing good sleep hygiene to improve sleep quantity and quality. · Communication/Consultation:  None today  · Equipment provided today: Blue band  · Recommendations/Intent for next treatment session: Next visit will focus on improving L shoulder strength and activity tolerance.     Total Treatment Billable Duration:  40 minutes  PT Patient Time In/Time Out  Time In: 3708  Time Out: [address] [place] [address] Aime Hammond, PT, DPT    Future Appointments   Date Time Provider Kaylie Garner   10/27/2020  2:45 PM Arian Tejeda [facility]   10/29/2020  2:45 PM Arian Tejeda [facility]

## 2020-10-27 ENCOUNTER — HOSPITAL ENCOUNTER (OUTPATIENT)
Dept: PHYSICAL THERAPY | Age: 55
Discharge: HOME | End: 2020-10-27
Payer: COMMERCIAL

## 2020-10-27 PROCEDURE — 97110 THERAPEUTIC EXERCISES: CPT

## 2020-10-27 NOTE — PROGRESS NOTES
Pauline Melvin  : 1965  Primary: [place] Alvares  Secondary:  [facility]  [facility]  [phone]   [phone]      OUTPATIENT PHYSICAL THERAPY: Daily Treatment and progress Note 10/27/2020  Visit Count:  8    ICD-10: Treatment Diagnosis: Pain in left shoulder (M25.512) and Stiffness of left shoulder, not elsewhere classified (M25.612)  TREATMENT PLAN:  Effective Dates: 10/27/2020 TO 2020. Frequency/Duration: 2 times a week for 2 weeks   GOALS: (Goals have been discussed and agreed upon with patient.)  Short-Term Functional Goals: Time Frame: 2 weeks  # Goal Status   1 Pt will confirm compliance with home program to facilitate improvement in function. 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 He estimates he is back to ~75-85% function on L compared to R PROGRESSING       Pre-treatment Symptoms/Complaints:  Pt reports his shoulder has been doing pretty well. He increased intensity with his boxing drills to ~75% PLOF. He reports he was startled earlier this week and threw his L arm up overhead instinctively. He reports he had tenderness with movement but it went away quickly but he knows shoulder is not completely back to normal.   Pain: Initial:   0/10 Post Session:  0/10   Medications Last Reviewed: 10/27/2020  Updated Objective Findings: Below measures from initial evaluation unless otherwise noted.   20  Pain at worst: 5/10  Observation: Unremarkable  ROM:    Left Right   Elevation (°) 150  154   Behind neck reach To upper back To upper back   Behind back reach To TLJ  To TLJ   Cross body reach To opposite scapula To opposite scapula   Pt reports mild pain with L abduction. UE ANDT: All within normal limits B  Strength: 5/5 throughout for B arms except 4/5 L ER which also is painful. Palpation: no TTP throughout L shoulder  DASH: 29/55    10/27/20:   5/5 L elbow flexion and extension, 5/5 L IR and abduction. 3+/5 L ER with tenderness. TREATMENT:   THERAPEUTIC ACTIVITY: (see chart for minutes): Therapeutic activities per grid below to improve mobility, strength, balance and coordination. Required minimal visual, verbal and tactile cues to improve independence with functional mobility and activities. THERAPEUTIC EXERCISE: (see chart for minutes):  Exercises per grid below to improve mobility, strength, balance and coordination. Required minimal visual, verbal and tactile cues to promote proper body alignment and promote proper body mechanics. Progressed resistance, range, repetitions and complexity of movement as indicated. NEUROMUSCULAR RE-EDUCATION: (see chart for minutes):  Exercise/activities per grid below to improve balance, coordination, kinesthetic sense, posture, pain, and proprioception. Required minimal visual, verbal and tactile cues to promote motor control of left shoulder. MANUAL THERAPY: (see chart for minutes): Joint mobilization, Soft tissue mobilization, skin mobilization, and muscle energy techniques were utilized and necessary because of the patient's restricted joint motion, restricted motion of soft tissue and pain . MODALITIES: (see chart for minutes):      *  Cold Pack Therapy in order to provide analgesia. Date: 10/13/20 (visit 5) 10/15/20 (visit 6)  10/22/20 (visit 7)  10/27/20 (visit 8) PN    Modalities:                Therapeutic Exercise: 50 min 40 min 40 min 38 min     UBE: 3'/3' L4   Around the world with 20# KB: x5 B  TRX Push-up: 2x5  TRX pike: 2x6   Thoracic bridge: 2x2 B, pt reported this was challenging to L shoulder.    Clapper push-up: x6, no pain  KB OP press: 8x20# B  Barbell curl: 2x8x65#  Pt educated on adding thoracic bridge and trying out other movements at home. Around the world with 20# KB: x5 B  OP: 10x65#   Push press: 4o13a69#   Thoracic bridge: 2x2 B   Curls with KB: 2x8x20# B, supinated, pronated, and neutral. Pt reported pronated  he could feel in L shoulder  KB swinx6x20#   KB clean: 5x20# B   Throw 2# ball into trampoline: 3x10 L  [place] press: 6x10# in each hand  Around the world with 20# KB: x5 B  PP: 10x65#, 10x95#   KB overhead press: 2f26a72#, second set bell up  Thoracic bridge: 2x2 B   DB Curls: 6l77r15# B, supinated, pronated, and neutral, no pain. Throw 2# ball into trampoline: 2x10 L  Barbell row: 10x115#   Bear crawl: 1 lap forwards and backwards  Band ER: 2x10 red  Band pull-apart: 2x10 red  Band ER in abduction: x10 red  S/L ER: 2x10x4# L  Supine IR arc: 2x10x4# L  Prone ER in abduction: 2x10x4# L  [place] press: 2x10x4# B    Neuromuscular re-education                Manual Therapy:                Therapeutic Activities:                  Home program: 20: Band ER, pull-apart, and D2 flexion  10/13/20: thoracic bridge    MedBridge Portal  Treatment/Session Summary:    · Response to Treatment: External rotation exercises emphasized today. Pt tolerated them well with no pain, just occasional feeling of tenderness. He has made significant improvements in L shoulder pain and strength but continues to have impaired ER strength. He will benefit from continued physical therapy to continue improving L shoulder strength. · Communication/Consultation:  None today  · Equipment provided today: Blue band  · Recommendations/Intent for next treatment session: Next visit will focus on improving L shoulder strength and activity tolerance.     Total Treatment Billable Duration:  38 minutes  PT Patient Time In/Time Out  Time In: 7470  Time Out:  Justin Weir PT, DPT    Future Appointments   Date Time Provider Kaylie Garner   10/29/2020  2:45 PM Evy Gracia [facility]

## 2020-10-29 ENCOUNTER — HOSPITAL ENCOUNTER (OUTPATIENT)
Dept: PHYSICAL THERAPY | Age: 55
Discharge: HOME | End: 2020-10-29
Payer: COMMERCIAL
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
## 2020-11-03 ENCOUNTER — HOSPITAL ENCOUNTER (OUTPATIENT)
Dept: PHYSICAL THERAPY | Age: 55
Discharge: HOME | End: 2020-11-03
Payer: COMMERCIAL

## 2020-11-03 PROCEDURE — 97110 THERAPEUTIC EXERCISES: CPT

## 2020-11-03 NOTE — PROGRESS NOTES
Rebeca   : 1965  Primary: Naima Schrader Giorgio  Secondary:  Parul Sneed [address], [address] [place] [address]  [phone]   [phone]      OUTPATIENT PHYSICAL THERAPY: Daily Treatment Note 11/3/2020  Visit Count:  9    ICD-10: Treatment Diagnosis: Pain in left shoulder (M25.512) and Stiffness of left shoulder, not elsewhere classified (M25.612)  TREATMENT PLAN:  Effective Dates: 10/27/2020 TO 2020. Frequency/Duration: 2 times a week for 2 weeks   GOALS: (Goals have been discussed and agreed upon with patient.)  Short-Term Functional Goals: Time Frame: 2 weeks  # Goal Status   1 Pt will confirm compliance with home program to facilitate improvement in function. 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 He estimates he is back to ~75-85% function on L compared to R PROGRESSING       Pre-treatment Symptoms/Complaints:  Pt reports his shoulder continues to improve, he can tell he is gaining strength in it. He reports he will still occasionally feel it like if he has to swat a fly overhead and loaded reaching in front of him. Pain: Initial:   0/10 Post Session:  0/10   Medications Last Reviewed: 11/3/2020  Updated Objective Findings: Below measures from initial evaluation unless otherwise noted. 20  Pain at worst: 5/10  Observation: Unremarkable  ROM:    Left Right   Elevation (°) 150  154   Behind neck reach To upper back To upper back   Behind back reach To TLJ  To TLJ   Cross body reach To opposite scapula To opposite scapula   Pt reports mild pain with L abduction. UE ANDT: All within normal limits B  Strength: 5/5 throughout for B arms except 4/5 L ER which also is painful. Palpation: no TTP throughout L shoulder  DASH: 29/55    10/27/20:   5/5 L elbow flexion and extension, 5/5 L IR and abduction. 3+/5 L ER with tenderness. TREATMENT:   THERAPEUTIC ACTIVITY: (see chart for minutes): Therapeutic activities per grid below to improve mobility, strength, balance and coordination. Required minimal visual, verbal and tactile cues to improve independence with functional mobility and activities. THERAPEUTIC EXERCISE: (see chart for minutes):  Exercises per grid below to improve mobility, strength, balance and coordination. Required minimal visual, verbal and tactile cues to promote proper body alignment and promote proper body mechanics. Progressed resistance, range, repetitions and complexity of movement as indicated. NEUROMUSCULAR RE-EDUCATION: (see chart for minutes):  Exercise/activities per grid below to improve balance, coordination, kinesthetic sense, posture, pain, and proprioception. Required minimal visual, verbal and tactile cues to promote motor control of left shoulder. MANUAL THERAPY: (see chart for minutes): Joint mobilization, Soft tissue mobilization, skin mobilization, and muscle energy techniques were utilized and necessary because of the patient's restricted joint motion, restricted motion of soft tissue and pain . MODALITIES: (see chart for minutes):      *  Cold Pack Therapy in order to provide analgesia. Date: 10/13/20 (visit 5) 10/15/20 (visit 6)  10/22/20 (visit 7)  10/27/20 (visit 8) PN 11/3/20 (visit 9)    Modalities:                Therapeutic Exercise: 50 min 40 min 40 min 38 min 40 min    UBE: 3'/3' L4   Around the world with 20# KB: x5 B  TRX Push-up: 2x5  TRX pike: 2x6   Thoracic bridge: 2x2 B, pt reported this was challenging to L shoulder. Clapper push-up: x6, no pain  KB OP press: 8x20# B  Barbell curl: 2x8x65#  Pt educated on adding thoracic bridge and trying out other movements at home.      Around the world with 20# KB: x5 B  OP: 10x65# Push press: 4z93l13#   Thoracic bridge: 2x2 B   Curls with KB: 2x8x20# B, supinated, pronated, and neutral. Pt reported pronated  he could feel in L shoulder  KB swinx6x20#   KB clean: 5x20# B   Throw 2# ball into trampoline: 3x10 L  [place] press: 6x10# in each hand  Around the world with 20# KB: x5 B  PP: 10x65#, 10x95#   KB overhead press: 4x83d28#, second set bell up  Thoracic bridge: 2x2 B   DB Curls: 3w08k61# B, supinated, pronated, and neutral, no pain. Throw 2# ball into trampoline: 2x10 L  Barbell row: 10x115#   Bear crawl: 1 lap forwards and backwards  Band ER: 2x10 red  Band pull-apart: 2x10 red  Band ER in abduction: x10 red  S/L ER: 2x10x4# L  Supine IR arc: 2x10x4# L  Prone ER in abduction: 2x10x4# L  [place] press: 2x10x4# B UBE: 3'/3' L4  Band ER: 2x10 red  Band IR: 2x10 red  Band ER in abduction: 2x10 red  S/L ER: 2x10x5# L  Prone ER in abduction: 2x10x5# L  [place] press: 6v91l78# bar  Standing horizontal press: 2x10x5# B   Neuromuscular re-education                Manual Therapy:                Therapeutic Activities:                  Home program: 20: Band ER, pull-apart, and D2 flexion  10/13/20: thoracic bridge    MedBridge Portal  Treatment/Session Summary:    · Response to Treatment: External rotation exercises continued to be emphasized today which pt tolerated well, with no pain just fatigue. 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 · Communication/Consultation:  None today  · Equipment provided today: Blue band  · Recommendations/Intent for next treatment session: Next visit will focus on improving L shoulder strength and activity tolerance.     Total Treatment Billable Duration:  40 minutes  PT Patient Time In/Time Out  Time In: 1150  Time Out: [address] NS[address]. Ralf Koehler, PT, DPT    Future Appointments   Date Time Provider Kaylie Garner   2020  2:45 PM Eileen Erickson [facility]   11/10/2020  2:45 PM Wadie Paci [facility]   11/12/2020  3:30 PM Razia Willams [facility]

## 2020-11-05 ENCOUNTER — HOSPITAL ENCOUNTER (OUTPATIENT)
Dept: PHYSICAL THERAPY | Age: 55
Discharge: HOME | End: 2020-11-05
Payer: COMMERCIAL

## 2020-11-05 PROCEDURE — 97110 THERAPEUTIC EXERCISES: CPT

## 2020-11-05 NOTE — PROGRESS NOTES
Lakisha Amin  : 1965  Primary: Marcelina Quintana  Secondary:  [place] [address].  [phone]   [phone]      OUTPATIENT PHYSICAL THERAPY: Daily Treatment Note 2020  Visit Count:  10    ICD-10: Treatment Diagnosis: Pain in left shoulder (M25.512) and Stiffness of left shoulder, not elsewhere classified (M25.612)  TREATMENT PLAN:  Effective Dates: 10/27/2020 TO 2020. Frequency/Duration: 2 times a week for 2 weeks   GOALS: (Goals have been discussed and agreed upon with patient.)  Short-Term Functional Goals: Time Frame: 2 weeks  # Goal Status   1 Pt will confirm compliance with home program to facilitate improvement in function. 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 He estimates he is back to ~75-85% function on L compared to R PROGRESSING       Pre-treatment Symptoms/Complaints:  Pt reports he saw shoulder MD just before coming here. He says MD was satisfied with his progress so far and wants him to continue PT to continue getting stronger. Pt reports that his shoulder is doing well with most of his ADLs, though every now and then he will move it in a way that does not feel normal yet. Pain: Initial:   0/10 Post Session:  0/10   Medications Last Reviewed: 2020  Updated Objective Findings: Below measures from initial evaluation unless otherwise noted. 20  Pain at worst: 5/10  Observation: Unremarkable  ROM:    Left Right   Elevation (°) 150  154   Behind neck reach To upper back To upper back   Behind back reach To TLJ  To TLJ   Cross body reach To opposite scapula To opposite scapula   Pt reports mild pain with L abduction.     UE ANDT: All within normal limits B  Strength: 5/5 throughout for B arms except 4/5 L ER which also is painful. Palpation: no TTP throughout L shoulder  DASH: 29/55    10/27/20:   5/5 L elbow flexion and extension, 5/5 L IR and abduction. 3+/5 L ER with tenderness. TREATMENT:   THERAPEUTIC ACTIVITY: (see chart for minutes): Therapeutic activities per grid below to improve mobility, strength, balance and coordination. Required minimal visual, verbal and tactile cues to improve independence with functional mobility and activities. THERAPEUTIC EXERCISE: (see chart for minutes):  Exercises per grid below to improve mobility, strength, balance and coordination. Required minimal visual, verbal and tactile cues to promote proper body alignment and promote proper body mechanics. Progressed resistance, range, repetitions and complexity of movement as indicated. NEUROMUSCULAR RE-EDUCATION: (see chart for minutes):  Exercise/activities per grid below to improve balance, coordination, kinesthetic sense, posture, pain, and proprioception. Required minimal visual, verbal and tactile cues to promote motor control of left shoulder. MANUAL THERAPY: (see chart for minutes): Joint mobilization, Soft tissue mobilization, skin mobilization, and muscle energy techniques were utilized and necessary because of the patient's restricted joint motion, restricted motion of soft tissue and pain . MODALITIES: (see chart for minutes):      *  Cold Pack Therapy in order to provide analgesia.      Date: 10/27/20 (visit 8) PN 11/3/20 (visit 9)  11/5/20 (visit 10)       Modalities:                  Therapeutic Exercise: 38 min 40 min 40 min       Band ER: 2x10 red  Band pull-apart: 2x10 red  Band ER in abduction: x10 red  S/L ER: 2x10x4# L  Supine IR arc: 2x10x4# L  Prone ER in abduction: 2x10x4# L  [place] press: 2x10x4# B UBE: 3'/3' L4  Band ER: 2x10 red  Band IR: 2x10 red  Band ER in abduction: 2x10 red  S/L ER: 2x10x5# L  Prone ER in abduction: 2x10x5# L  [place] press: 0a56n65# bar  Standing horizontal press: 2x10x5# B UBE: 3'/3' L4  Standing flexion: 2x10x5# B  Standing abduction: 2x10x5# B  Band ER: 2x10 red  Band pull-apart: 2x10 red  Band ER in abduction: 2x10 red  S/L ER: 2x10x5# L  Prone ER in abduction: 2x10x5# L  [place] press: 2x10x5# B  Standing horizontal press: 8j99b61# B with more difficulty on L      Neuromuscular re-education                  Manual Therapy:                  Therapeutic Activities:                    Home program: 9/30/20: Band ER, pull-apart, and D2 flexion  10/13/20: thoracic bridge    MedBridge Portal  Treatment/Session Summary:    · Response to Treatment: Pt demonstrates improved strength with ER exercises though he still has let strength with L shoulder ER and lifting tasks compared to R. He was educated to start working on speed tasks with sparring. · Communication/Consultation:  None today  · Equipment provided today: Blue band  · Recommendations/Intent for next treatment session: Next visit will focus on improving L shoulder strength and activity tolerance. Work on ground work.      Total Treatment Billable Duration:  40 minutes  PT Patient Time In/Time Out  Time In: 1450  Time Out: [address] [place] [address] Teodoro Polanco, PT, DPT    Future Appointments   Date Time Provider Kaylie Garner   11/10/2020  2:45 PM Marilyn Nova [facility]   11/12/2020  3:30 PM Marilyn Nova [facility]

## 2020-11-10 ENCOUNTER — HOSPITAL ENCOUNTER (OUTPATIENT)
Dept: PHYSICAL THERAPY | Age: 55
Discharge: HOME | End: 2020-11-10
Payer: COMMERCIAL

## 2020-11-10 PROCEDURE — 97110 THERAPEUTIC EXERCISES: CPT

## 2020-11-10 NOTE — PROGRESS NOTES
Vera Skyler  : 1965  Primary: Cristo Branhamsaw  Secondary:  Sen Borges  [address] [facility], [address] [place] [address]  [phone]   [phone]      OUTPATIENT PHYSICAL THERAPY: Daily Treatment and progress Note 11/10/2020  Visit Count:  11    ICD-10: Treatment Diagnosis: Pain in left shoulder (M25.512) and Stiffness of left shoulder, not elsewhere classified (M25.612)  TREATMENT PLAN:  Effective Dates: 11/10/2020 TO 2020. Frequency/Duration: 2 times a week for 4 weeks   GOALS: (Goals have been discussed and agreed upon with patient.)  Short-Term Functional Goals: Time Frame: 2 weeks  # Goal Status   1 Pt will confirm compliance with home program to facilitate improvement in function. 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 He estimates he is back to ~75-85% function on L compared to R  11/10/20: 4+/5 L ER in adduction. Pt still with discomfort performing ER in abduction and loaded flexion. PROGRESSING       Pre-treatment Symptoms/Complaints:  Pt reports his shoulder has been doing pretty well though he will still feel discomfort when lifting something heavy in front of him. He reports it is also  at night. Pain: Initial:   0/10 Post Session:  0/10   Medications Last Reviewed: 11/10/2020  Updated Objective Findings: Below measures from initial evaluation unless otherwise noted. 20  Pain at worst: 5/10  Observation: Unremarkable  ROM:    Left Right   Elevation (°) 150  154   Behind neck reach To upper back To upper back   Behind back reach To TLJ  To TLJ   Cross body reach To opposite scapula To opposite scapula   Pt reports mild pain with L abduction.     UE ANDT: All within normal limits B  Strength: 5/5 throughout for B arms except 4/5 L ER which also is painful. Palpation: no TTP throughout L shoulder  DASH: 29/55    10/27/20:   5/5 L elbow flexion and extension, 5/5 L IR and abduction. 3+/5 L ER with tenderness. 11/10/20: 4+/5 L ER. Mild discomfort with ER in abduction and standing forward press   TREATMENT:   THERAPEUTIC ACTIVITY: (see chart for minutes): Therapeutic activities per grid below to improve mobility, strength, balance and coordination. Required minimal visual, verbal and tactile cues to improve independence with functional mobility and activities. THERAPEUTIC EXERCISE: (see chart for minutes):  Exercises per grid below to improve mobility, strength, balance and coordination. Required minimal visual, verbal and tactile cues to promote proper body alignment and promote proper body mechanics. Progressed resistance, range, repetitions and complexity of movement as indicated. NEUROMUSCULAR RE-EDUCATION: (see chart for minutes):  Exercise/activities per grid below to improve balance, coordination, kinesthetic sense, posture, pain, and proprioception. Required minimal visual, verbal and tactile cues to promote motor control of left shoulder. MANUAL THERAPY: (see chart for minutes): Joint mobilization, Soft tissue mobilization, skin mobilization, and muscle energy techniques were utilized and necessary because of the patient's restricted joint motion, restricted motion of soft tissue and pain . MODALITIES: (see chart for minutes):      *  Cold Pack Therapy in order to provide analgesia.      Date: 10/27/20 (visit 8) PN 11/3/20 (visit 9)  11/5/20 (visit 10)  11/1/0/20 (visit 11) PN     Modalities:                  Therapeutic Exercise: 38 min 40 min 40 min 43 min      Band ER: 2x10 red  Band pull-apart: 2x10 red  Band ER in abduction: x10 red  S/L ER: 2x10x4# L  Supine IR arc: 2x10x4# L  Prone ER in abduction: 2x10x4# L  [place] press: 2x10x4# B UBE: 3'/3' L4  Band ER: 2x10 red  Band IR: 2x10 red  Band ER in abduction: 2x10 red  S/L ER: 2x10x5# L  Prone ER in abduction: 2x10x5# L  [place] press: [id number]# bar  Standing horizontal press: 2x10x5# B UBE: 3'/3' L4  Standing flexion: 2x10x5# B  Standing abduction: 2x10x5# B  Band ER: 2x10 red  Band pull-apart: 2x10 red  Band ER in abduction: 2x10 red  S/L ER: 2x10x5# L  Prone ER in abduction: 2x10x5# L  [place] press: 2x10x5# B  Standing horizontal press: 3x95o71# B with more difficulty on L UBE: 3'/3' L4  Standing flexion: 2x10x5# B  Standing abduction: 2x10x5# B  S/L ER: 10x10#, 10x5# L  [place] press: 10x5# B  Standing horizontal press: [id number]# B Push-ups: x29 with symmetrical form  D2 flexion: 10x5# L, 10x5# L bent over      Neuromuscular re-education                  Manual Therapy:                  Therapeutic Activities:                    Home program: 9/30/20: Band ER, pull-apart, and D2 flexion  10/13/20: thoracic bridge    [facility] Portal  Treatment/Session Summary:    · Response to Treatment: Pt continues to improve L ER strength and loaded flexion though both of these motions are still not as strong as R. He has improved functional use of L shoulder but will continue to benefit from skilled physical therapy to improve strength so he can return to work including lifting and moving heavy objects without limitations. · Communication/Consultation:  None today  · Equipment provided today: Blue band  · Recommendations/Intent for next treatment session: Next visit will focus on improving L shoulder strength and activity tolerance. He will be seen once this week and is scheduled to come in once next week.      Total Treatment Billable Duration:  43 minutes  PT Patient Time In/Time Out  Time In: 9356  Time Out: Misbah Monique 82, PT, DPT    Future Appointments   Date Time Provider Kaylie Garner   11/12/2020  3:30 PM Chris Tovar [facility]   11/19/2020  4:15 PM Chris Tovar [facility]

## 2020-11-12 ENCOUNTER — APPOINTMENT (OUTPATIENT)
Dept: PHYSICAL THERAPY | Age: 55
End: 2020-11-12
Payer: COMMERCIAL

## 2020-11-16 ENCOUNTER — HOSPITAL ENCOUNTER (OUTPATIENT)
Dept: PHYSICAL THERAPY | Age: 55
Discharge: HOME | End: 2020-11-16
Payer: COMMERCIAL

## 2020-11-16 PROCEDURE — 97110 THERAPEUTIC EXERCISES: CPT

## 2020-11-16 NOTE — PROGRESS NOTES
Richard Hancock  : 1965  Primary: David Alvares  Secondary:  Chaparro Ggae [address]  [phone]   [phone]      OUTPATIENT PHYSICAL THERAPY: Daily Treatment Note 2020  Visit Count:  12    ICD-10: Treatment Diagnosis: Pain in left shoulder (M25.512) and Stiffness of left shoulder, not elsewhere classified (M25.612)  TREATMENT PLAN:  Effective Dates: 11/10/2020 TO 2020. Frequency/Duration: 2 times a week for 4 weeks   GOALS: (Goals have been discussed and agreed upon with patient.)  Short-Term Functional Goals: Time Frame: 2 weeks  # Goal Status   1 Pt will confirm compliance with home program to facilitate improvement in function. 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 He estimates he is back to ~75-85% function on L compared to R  11/10/20: 4+/5 L ER in adduction. Pt still with discomfort performing ER in abduction and loaded flexion. PROGRESSING       Pre-treatment Symptoms/Complaints:  Pt reports he did a lot with his shoulder over the weekend so it was sore. He shoveled gravel and had to build a bridge. He reports shoulder still hurts at night as well. Pain: Initial:   0/10 Post Session:  0/10   Medications Last Reviewed: 2020  Updated Objective Findings: Below measures from initial evaluation unless otherwise noted. 20  Pain at worst: 5/10  Observation: Unremarkable  ROM:    Left Right   Elevation (°) 150  154   Behind neck reach To upper back To upper back   Behind back reach To TLJ  To TLJ   Cross body reach To opposite scapula To opposite scapula   Pt reports mild pain with L abduction.     UE ANDT: All within normal limits B  Strength: 5/5 throughout for B arms except 4/5 L ER which also is painful. Palpation: no TTP throughout L shoulder  DASH: 29/55    10/27/20:   5/5 L elbow flexion and extension, 5/5 L IR and abduction. 3+/5 L ER with tenderness. 11/10/20: 4+/5 L ER. Mild discomfort with ER in abduction and standing forward press   TREATMENT:   THERAPEUTIC ACTIVITY: (see chart for minutes): Therapeutic activities per grid below to improve mobility, strength, balance and coordination. Required minimal visual, verbal and tactile cues to improve independence with functional mobility and activities. THERAPEUTIC EXERCISE: (see chart for minutes):  Exercises per grid below to improve mobility, strength, balance and coordination. Required minimal visual, verbal and tactile cues to promote proper body alignment and promote proper body mechanics. Progressed resistance, range, repetitions and complexity of movement as indicated. NEUROMUSCULAR RE-EDUCATION: (see chart for minutes):  Exercise/activities per grid below to improve balance, coordination, kinesthetic sense, posture, pain, and proprioception. Required minimal visual, verbal and tactile cues to promote motor control of left shoulder. MANUAL THERAPY: (see chart for minutes): Joint mobilization, Soft tissue mobilization, skin mobilization, and muscle energy techniques were utilized and necessary because of the patient's restricted joint motion, restricted motion of soft tissue and pain . MODALITIES: (see chart for minutes):      *  Cold Pack Therapy in order to provide analgesia.      Date: 10/27/20 (visit 8) PN 11/3/20 (visit 9)  11/5/20 (visit 10)  11/10/20 (visit 11) PN 11/16/20 (visit 12)     Modalities:                  Therapeutic Exercise: 38 min 40 min 40 min 43 min 45 min     Band ER: 2x10 red  Band pull-apart: 2x10 red  Band ER in abduction: x10 red  S/L ER: 2x10x4# L  Supine IR arc: 2x10x4# L  Prone ER in abduction: 2x10x4# L  [place] press: 2x10x4# B UBE: 3'/3' L4  Band ER: 2x10 red  Band IR: 2x10 red  Band ER in abduction: 2x10 red  S/L ER: 2x10x5# L  Prone ER in abduction: 2x10x5# L  [place] press: [id number]# bar  Standing horizontal press: 2x10x5# B UBE: 3'/3' L4  Standing flexion: 2x10x5# B  Standing abduction: 2x10x5# B  Band ER: 2x10 red  Band pull-apart: 2x10 red  Band ER in abduction: 2x10 red  S/L ER: 2x10x5# L  Prone ER in abduction: 2x10x5# L  [place] press: 2x10x5# B  Standing horizontal press: [id number]# B with more difficulty on L UBE: 3'/3' L4  Standing flexion: 2x10x5# B  Standing abduction: 2x10x5# B  S/L ER: 10x10#, 10x5# L  [place] press: 10x5# B  Standing horizontal press: [id number]# B Push-ups: x29 with symmetrical form  D2 flexion: 10x5# L, 10x5# L bent over  UBE: 3'/3' L4  Standing flexion: 2x10x5# B  Standing abduction: 2x10x5# B  [place] press: 2x10x5# B  Standing press: [id number]# B  PU: x10   1-arm dominant push-ups: x3 B, x5 B which pt found challenging   Shoulder circles with 9# bar: 2x5 B  Chop pattern with 9# bar: 2x5 B with second set with lower  to increase difficulty     Neuromuscular re-education                  Manual Therapy:                  Therapeutic Activities:                    Home program: 9/30/20: Band ER, pull-apart, and D2 flexion  10/13/20: thoracic bridge    [facility] Portal  Treatment/Session Summary:    · Response to Treatment: Pt was challenged with new exercises today and said he will continue to work on his exercises and add 1-arm dominant push-up. · Communication/Consultation:  None today  · Equipment provided today: Blue band  · Recommendations/Intent for next treatment session: Next visit will focus on improving L shoulder strength and activity tolerance.     Total Treatment Billable Duration:  45 minutes  PT Patient Time In/Time Out  Time In: 3189  Time Out: [address] Milo Ruther[place], PT, DPT    Future Appointments   Date Time Provider Kaylie Garner   11/19/2020  4:15 PM Razia Kale [facility]   12/1/2020 2:00 PM Arian Breeding [facility]   12/3/2020  2:00 PM [place] Breeding [facility]   12/7/2020  2:00 PM Arian Breeding [facility]   12/10/2020  2:45 PM [place] Breeding [facility]   12/15/2020  2:00 PM Arian Breeding [facility]   12/17/2020  2:45 PM Arian Breeding [facility]   12/21/2020  2:45 PM [place] Breeding [facility]   12/28/2020  2:45 PM [place] Breeding [facility]   12/30/2020  2:45 PM [place] Breeding [facility]

## 2020-11-19 ENCOUNTER — HOSPITAL ENCOUNTER (OUTPATIENT)
Dept: PHYSICAL THERAPY | Age: 55
Discharge: HOME | End: 2020-11-19
Payer: COMMERCIAL

## 2020-11-19 PROCEDURE — 97110 THERAPEUTIC EXERCISES: CPT

## 2020-11-19 NOTE — PROGRESS NOTES
Ruddy Tavares  : 1965  Primary: Carmen Quintanarissaw  Secondary:  Lieutenant Cazares [address], [facility].  [phone]   [phone]      OUTPATIENT PHYSICAL THERAPY: Daily Treatment Note 2020  Visit Count:  13    ICD-10: Treatment Diagnosis: Pain in left shoulder (M25.512) and Stiffness of left shoulder, not elsewhere classified (M25.612)  TREATMENT PLAN:  Effective Dates: 11/10/2020 TO 2020. Frequency/Duration: 2 times a week for 4 weeks   GOALS: (Goals have been discussed and agreed upon with patient.)  Short-Term Functional Goals: Time Frame: 2 weeks  # Goal Status   1 Pt will confirm compliance with home program to facilitate improvement in function. 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 He estimates he is back to ~75-85% function on L compared to R  11/10/20: 4+/5 L ER in adduction. Pt still with discomfort performing ER in abduction and loaded flexion. PROGRESSING       Pre-treatment Symptoms/Complaints:  Pt reports he is pretty tired today since he had to do a lot of yardwork for his aunt yesterday. He reports shoulder still hurts at night but he does not take any medication for pain anymore. Pain: Initial:   0/10 Post Session:  0/10   Medications Last Reviewed: 2020  Updated Objective Findings: Below measures from initial evaluation unless otherwise noted. 20  Pain at worst: 5/10  Observation: Unremarkable  ROM:    Left Right   Elevation (°) 150  154   Behind neck reach To upper back To upper back   Behind back reach To TLJ  To TLJ   Cross body reach To opposite scapula To opposite scapula   Pt reports mild pain with L abduction.     UE ANDT: All within normal limits B  Strength: 5/5 throughout for B arms except 4/5 L ER which also is painful. Palpation: no TTP throughout L shoulder  DASH: 29/55    10/27/20:   5/5 L elbow flexion and extension, 5/5 L IR and abduction. 3+/5 L ER with tenderness. 11/10/20: 4+/5 L ER. Mild discomfort with ER in abduction and standing forward press   TREATMENT:   THERAPEUTIC ACTIVITY: (see chart for minutes): Therapeutic activities per grid below to improve mobility, strength, balance and coordination. Required minimal visual, verbal and tactile cues to improve independence with functional mobility and activities. THERAPEUTIC EXERCISE: (see chart for minutes):  Exercises per grid below to improve mobility, strength, balance and coordination. Required minimal visual, verbal and tactile cues to promote proper body alignment and promote proper body mechanics. Progressed resistance, range, repetitions and complexity of movement as indicated. NEUROMUSCULAR RE-EDUCATION: (see chart for minutes):  Exercise/activities per grid below to improve balance, coordination, kinesthetic sense, posture, pain, and proprioception. Required minimal visual, verbal and tactile cues to promote motor control of left shoulder. MANUAL THERAPY: (see chart for minutes): Joint mobilization, Soft tissue mobilization, skin mobilization, and muscle energy techniques were utilized and necessary because of the patient's restricted joint motion, restricted motion of soft tissue and pain . MODALITIES: (see chart for minutes):      *  Cold Pack Therapy in order to provide analgesia.      Date: 10/27/20 (visit 8) PN 11/3/20 (visit 9)  11/5/20 (visit 10)  11/10/20 (visit 11) PN 11/16/20 (visit 12)  11/19/20 (visit 13)    Modalities:                  Therapeutic Exercise: 38 min 40 min 40 min 43 min 45 min 45 min    Band ER: 2x10 red  Band pull-apart: 2x10 red  Band ER in abduction: x10 red  S/L ER: 2x10x4# L  Supine IR arc: 2x10x4# L  Prone ER in abduction: 2x10x4# L  [place] press: 2x10x4# B UBE: 3'/3' L4  Band ER: 2x10 red  Band IR: 2x10 red  Band ER in abduction: 2x10 red  S/L ER: 2x10x5# L  Prone ER in abduction: 2x10x5# L  [place] press: [id number]# bar  Standing horizontal press: 2x10x5# B UBE: 3'/3' L4  Standing flexion: 2x10x5# B  Standing abduction: 2x10x5# B  Band ER: 2x10 red  Band pull-apart: 2x10 red  Band ER in abduction: 2x10 red  S/L ER: 2x10x5# L  Prone ER in abduction: 2x10x5# L  [place] press: 2x10x5# B  Standing horizontal press: [id number]# B with more difficulty on L UBE: 3'/3' L4  Standing flexion: 2x10x5# B  Standing abduction: 2x10x5# B  S/L ER: 10x10#, 10x5# L  [place] press: 10x5# B  Standing horizontal press: 5s12y45# B Push-ups: x29 with symmetrical form  D2 flexion: 10x5# L, 10x5# L bent over  UBE: 3'/3' L4  Standing flexion: 2x10x5# B  Standing abduction: 2x10x5# B  [place] press: 2x10x5# B  Standing press: 4p43b49# B  PU: x10   1-arm dominant push-ups: x3 B, x5 B which pt found challenging   Shoulder circles with 9# bar: 2x5 B  Chop pattern with 9# bar: 2x5 B with second set with lower  to increase difficulty  UBE: 3'/3' L4  Standing flexion: 2x10x7# B  Standing abduction: 2x10x7# B  [place] press: 10x7# B  Standing press: [id number]# bar  PU: x10   1-arm dominant push-ups: 2x3 B   Shoulder circles with 9# bar: 2x5 B  S/L ER: 2x10x7# L  Median nerve mobilization: 2x10. Pt given printout . Neuromuscular re-education                  Manual Therapy:                  Therapeutic Activities:                    Home program: 9/30/20: Band ER, pull-apart, and D2 flexion  10/13/20: thoracic bridge  11/19/20: add median nerve mobilization    [facility]  Treatment/Session Summary:    · Response to Treatment: Pt reported mild tension in shoulder with fully loaded median nerve tension test which resolved tension after a few reps. He was given printout to help normalize neurodynamics.     · Communication/Consultation:  None today  · Equipment provided today: Blue band  · Recommendations/Intent for next treatment session: Next visit will focus on improving L shoulder strength and activity tolerance.     Total Treatment Billable Duration:  45 minutes  PT Patient Time In/Time Out  Time In: 1615  Time Out: 24 [facility] Lobo [place], PT, DPT    Future Appointments   Date Time Provider Kaylie Garner   12/1/2020  2:00 PM Jen Pilsner [facility]   12/3/2020  2:00 PM Jen Pilsner [facility]   12/7/2020  2:00 PM Jen Pilsner [facility]   12/10/2020  2:45 PM Jen Pilsner [facility]   12/15/2020  2:00 PM Jen Pilsner [facility]   12/17/2020  2:45 PM Jen Pilsner [facility]   12/21/2020  2:45 [facility]   12/28/2020  2:45 PM Jen Pilsner [facility]   12/30/2020  2:45 PM Jen Pilsner [facility]

## 2020-11-20 ENCOUNTER — APPOINTMENT (OUTPATIENT)
Dept: PHYSICAL THERAPY | Age: 55
End: 2020-11-20
Payer: COMMERCIAL

## 2020-12-02 ENCOUNTER — HOSPITAL ENCOUNTER (OUTPATIENT)
Dept: PHYSICAL THERAPY | Age: 55
Discharge: HOME | End: 2020-12-02
Payer: COMMERCIAL

## 2020-12-02 PROCEDURE — 97110 THERAPEUTIC EXERCISES: CPT

## 2020-12-02 NOTE — PROGRESS NOTES
Jarad Lindsey  : 1965  Primary: Natividad Oliveira Giorgio  Secondary:  Ed Narayanan [address]  [phone]   [phone]      OUTPATIENT PHYSICAL THERAPY: Daily Treatment Note 2020  Visit Count:  14    ICD-10: Treatment Diagnosis: Pain in left shoulder (M25.512) and Stiffness of left shoulder, not elsewhere classified (M25.612)  TREATMENT PLAN:  Effective Dates: 11/10/2020 TO 2020. Frequency/Duration: 2 times a week for 4 weeks   GOALS: (Goals have been discussed and agreed upon with patient.)  Short-Term Functional Goals: Time Frame: 2 weeks  # Goal Status   1 Pt will confirm compliance with home program to facilitate improvement in function. 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 He estimates he is back to ~75-85% function on L compared to R  11/10/20: 4+/5 L ER in adduction. Pt still with discomfort performing ER in abduction and loaded flexion. PROGRESSING       Pre-treatment Symptoms/Complaints:  [place]. 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 He reports he still has pain at night but it is slowly improving so he can sleep better. Pain: Initial:   0/10 Post Session:  0/10   Medications Last Reviewed: 2020  Updated Objective Findings: Below measures from initial evaluation unless otherwise noted.   20  Pain at worst: 5/10  Observation: Unremarkable  ROM:    Left Right   Elevation (°) 150  154   Behind neck reach To upper back To upper back   Behind back reach To TLJ  To TLJ   Cross body reach To opposite scapula To opposite scapula   Pt reports mild pain with L abduction. UE ANDT: All within normal limits B  Strength: 5/5 throughout for B arms except 4/5 L ER which also is painful. Palpation: no TTP throughout L shoulder  DASH: 29/55    10/27/20:   5/5 L elbow flexion and extension, 5/5 L IR and abduction. 3+/5 L ER with tenderness. 11/10/20: 4+/5 L ER. Mild discomfort with ER in abduction and standing forward press   TREATMENT:   THERAPEUTIC ACTIVITY: (see chart for minutes): Therapeutic activities per grid below to improve mobility, strength, balance and coordination. Required minimal visual, verbal and tactile cues to improve independence with functional mobility and activities. THERAPEUTIC EXERCISE: (see chart for minutes):  Exercises per grid below to improve mobility, strength, balance and coordination. Required minimal visual, verbal and tactile cues to promote proper body alignment and promote proper body mechanics. Progressed resistance, range, repetitions and complexity of movement as indicated. NEUROMUSCULAR RE-EDUCATION: (see chart for minutes):  Exercise/activities per grid below to improve balance, coordination, kinesthetic sense, posture, pain, and proprioception. Required minimal visual, verbal and tactile cues to promote motor control of left shoulder. MANUAL THERAPY: (see chart for minutes): Joint mobilization, Soft tissue mobilization, skin mobilization, and muscle energy techniques were utilized and necessary because of the patient's restricted joint motion, restricted motion of soft tissue and pain . MODALITIES: (see chart for minutes):      *  Cold Pack Therapy in order to provide analgesia.      Date: 11/19/20 (visit 13)  12/2/20 (visit 14)       Modalities:                Therapeutic Exercise: 45 min 43 min       UBE: 3'/3' L4  Standing flexion: 2x10x7# B  Standing abduction: 2x10x7# B  [place] press: 10x7# B  Standing press: [id number]# bar  PU: x10   1-arm dominant push-ups: 2x3 B   Shoulder circles with 9# bar: 2x5 B  S/L ER: 2x10x7# L  Median nerve mobilization: 2x10. Pt given printout . Standing flexion: 2x10x8# B  Standing abduction: 2x10x8# B  Standing press: [id number]# bar  PU: 2x10   1-arm dominant push-ups: 2x3 B   Shoulder circles with 9# bar: 3x5 B  S/L ER: 2x10x8# L  Median nerve mobilization: x5. No neural tension anymore in any position. Bar swing: 10x9# B       Neuromuscular re-education                Manual Therapy:                Therapeutic Activities:                  Home program: 9/30/20: Band ER, pull-apart, and D2 flexion  10/13/20: thoracic bridge  11/19/20: add median nerve mobilization    MedBridge Portal  Treatment/Session Summary:    · Response to Treatment: Pt tolerated exercises well today. He reported shoulder raises with 8# were easy. He is still primarily challenged with S/L ER and he was educated to continue to work on that at home. · Communication/Consultation:  None today  · Equipment provided today: Blue band  · Recommendations/Intent for next treatment session: Next visit will focus on improving L shoulder strength and activity tolerance. Trial dips and rows.      Total Treatment Billable Duration:  43 minutes  PT Patient Time In/Time Out  Time In: 1400  Time Out: 88 [facility], PT, DPT    Future Appointments   Date Time Provider Kaylie Garner   12/3/2020  2:00 PM Jen Lisasshivani [facility]   12/7/2020  2:00 [facility]   [facility]/10[facility]2020  2[facility]45 [facility]   12/15/2020  2:00 PM Jen Pilsner [facility]   12/17/2020  2:45 PM [facility]   12/21/2020  2:45 PM [facility]   12/28/2020  2:45 PM [facility]   12/30/2020  2:45 PM [facility]

## 2020-12-03 ENCOUNTER — HOSPITAL ENCOUNTER (OUTPATIENT)
Dept: PHYSICAL THERAPY | Age: 55
Discharge: HOME | End: 2020-12-03
Payer: COMMERCIAL

## 2020-12-03 PROCEDURE — 97110 THERAPEUTIC EXERCISES: CPT

## 2020-12-03 NOTE — PROGRESS NOTES
Goyo Bland  : 1965  Primary: Gillian Alvares  Secondary:  Onelialivan Vang [address] [place].  [phone]   [phone]      OUTPATIENT PHYSICAL THERAPY: Daily Treatment Note 12/3/2020  Visit Count:  15    ICD-10: Treatment Diagnosis: Pain in left shoulder (M25.512) and Stiffness of left shoulder, not elsewhere classified (M25.612)  TREATMENT PLAN:  Effective Dates: 11/10/2020 TO 2020. Frequency/Duration: 2 times a week for 4 weeks   GOALS: (Goals have been discussed and agreed upon with patient.)  Short-Term Functional Goals: Time Frame: 2 weeks  # Goal Status   1 Pt will confirm compliance with home program to facilitate improvement in function. 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 He estimates he is back to ~75-85% function on L compared to R  11/10/20: 4+/5 L ER in adduction. Pt still with discomfort performing ER in abduction and loaded flexion. PROGRESSING       Pre-treatment Symptoms/Complaints:  Pt reports he had some very mild soreness yesterday from exercises. Pain: Initial:   0/10 Post Session:  0/10   Medications Last Reviewed: 12/3/2020  Updated Objective Findings: Below measures from initial evaluation unless otherwise noted. 20  Pain at worst: 5/10  Observation: Unremarkable  ROM:    Left Right   Elevation (°) 150  154   Behind neck reach To upper back To upper back   Behind back reach To TLJ  To TLJ   Cross body reach To opposite scapula To opposite scapula   Pt reports mild pain with L abduction. UE ANDT: All within normal limits B  Strength: 5/5 throughout for B arms except 4/5 L ER which also is painful.    Palpation: no TTP throughout L shoulder  DASH: 29/55    10/27/20:   5/5 L elbow flexion and extension, 5/5 L IR and abduction. 3+/5 L ER with tenderness. 11/10/20: 4+/5 L ER. Mild discomfort with ER in abduction and standing forward press   TREATMENT:   THERAPEUTIC ACTIVITY: (see chart for minutes): Therapeutic activities per grid below to improve mobility, strength, balance and coordination. Required minimal visual, verbal and tactile cues to improve independence with functional mobility and activities. THERAPEUTIC EXERCISE: (see chart for minutes):  Exercises per grid below to improve mobility, strength, balance and coordination. Required minimal visual, verbal and tactile cues to promote proper body alignment and promote proper body mechanics. Progressed resistance, range, repetitions and complexity of movement as indicated. NEUROMUSCULAR RE-EDUCATION: (see chart for minutes):  Exercise/activities per grid below to improve balance, coordination, kinesthetic sense, posture, pain, and proprioception. Required minimal visual, verbal and tactile cues to promote motor control of left shoulder. MANUAL THERAPY: (see chart for minutes): Joint mobilization, Soft tissue mobilization, skin mobilization, and muscle energy techniques were utilized and necessary because of the patient's restricted joint motion, restricted motion of soft tissue and pain . MODALITIES: (see chart for minutes):      *  Cold Pack Therapy in order to provide analgesia. Date: 11/19/20 (visit 13)  12/2/20 (visit 14)  12/3/20 (visit 15)      Modalities:                Therapeutic Exercise: 45 min 43 min 40 min      UBE: 3'/3' L4  Standing flexion: 2x10x7# B  Standing abduction: 2x10x7# B  [place] press: 10x7# B  Standing press: [id number]# bar  PU: x10   1-arm dominant push-ups: 2x3 B   Shoulder circles with 9# bar: 2x5 B  S/L ER: 2x10x7# L  Median nerve mobilization: 2x10. Pt given printout .  Standing flexion: 2x10x8# B  Standing abduction: 2x10x8# B  Standing press: [id number]# bar  PU: 2x10   1-arm dominant push-ups: 2x3 B   Shoulder circles with 9# bar: 3x5 B  S/L ER: 2x10x8# L  Median nerve mobilization: x5. No neural tension anymore in any position. Bar swing: 10x9# B  UBE: 3'/3' L4  Dips: 3x5   BW row: 2x10   [place] press: 10x12# bar, no pain  Bar swing: 3x5x12# B  S/L ER: 3x15x5# L     Neuromuscular re-education                Manual Therapy:                Therapeutic Activities:                  Home program: 9/30/20: Band ER, pull-apart, and D2 flexion  10/13/20: thoracic bridge  11/19/20: add median nerve mobilization    MedBridge Portal  Treatment/Session Summary:    · Response to Treatment: Pt tolerated new exercises well and reported no pain with them. He was able to perform [place] press with no pain today. · Communication/Consultation:  None today  · Equipment provided today: Blue band  · Recommendations/Intent for next treatment session: Next visit will focus on improving L shoulder strength and activity tolerance.      Total Treatment Billable Duration:  40 minutes  PT Patient Time In/Time Out  Time In: 8720  Time Out: Stacia, PT, DPT    Future Appointments   Date Time Provider Kaylie Garner   12/7/2020  2:00 PM [place]bol[place] Sauce [facility]   12/10/2020  2:45 PM Rebolledo Sauce [facility]   12/15/2020  2:00 PM Rebolledo Sauce [facility]   12/17/2020  2:45 PM Rebolledo Sauce [facility]   12/21/2020  2:45 PM Rebolledo Sauce [facility]   12/28/2020  2:45 PM Rebolledo Sauce [facility]   12/30/2020  2:45 PM [facility]

## 2020-12-08 ENCOUNTER — HOSPITAL ENCOUNTER (OUTPATIENT)
Dept: PHYSICAL THERAPY | Age: 55
Discharge: HOME | End: 2020-12-08
Payer: COMMERCIAL

## 2020-12-08 PROCEDURE — 97110 THERAPEUTIC EXERCISES: CPT

## 2020-12-09 ENCOUNTER — APPOINTMENT (OUTPATIENT)
Dept: PHYSICAL THERAPY | Age: 55
End: 2020-12-09
Payer: COMMERCIAL

## 2020-12-10 ENCOUNTER — HOSPITAL ENCOUNTER (OUTPATIENT)
Dept: PHYSICAL THERAPY | Age: 55
Discharge: HOME | End: 2020-12-10
Payer: COMMERCIAL

## 2020-12-10 PROCEDURE — 97110 THERAPEUTIC EXERCISES: CPT

## 2020-12-10 PROCEDURE — 97530 THERAPEUTIC ACTIVITIES: CPT

## 2020-12-10 NOTE — PROGRESS NOTES
Sampson Barbara  : 1965  Primary: Nohemy Alvares  Secondary:  Jase Gudino  [address] [address].  [phone]   [phone]      OUTPATIENT PHYSICAL THERAPY: Daily Treatment Note 12/10/2020  Visit Count:  17    ICD-10: Treatment Diagnosis: Pain in left shoulder (M25.512) and Stiffness of left shoulder, not elsewhere classified (M25.612)  TREATMENT PLAN:  Effective Dates: 12/10/2020 TO 2020. Frequency/Duration: 2 times a week for 2 weeks   GOALS: (Goals have been discussed and agreed upon with patient.)  Short-Term Functional Goals: Time Frame: 2 weeks  # Goal Status   1 Pt will confirm compliance with home program to facilitate improvement in function. 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 He estimates he is back to ~75-85% function on L compared to R  11/10/20: 4+/5 L ER in adduction. Pt still with discomfort performing ER in abduction and loaded flexion. 12/10/20: pt improving L shoulder strength significantly and able to perform S/L ER with 5 lb now. He reports shoulder will throb 1-2/day  PROGRESSING       Pre-treatment Symptoms/Complaints:  Pt reports he worked out today and yesterday, doing a variety of his shoulder exercises. Pain: Initial:   0/10 Post Session:  010, pt reported \"right now, I can't even tell there's a difference\" between his arms    Medications Last Reviewed: 12/10/2020  Updated Objective Findings: Below measures from initial evaluation unless otherwise noted.   20  Pain at worst: 5/10  Observation: Unremarkable  ROM:    Left Right   Elevation (°) 150  154   Behind neck reach To upper back To upper back   Behind back reach To TLJ  To TLJ Cross body reach To opposite scapula To opposite scapula   Pt reports mild pain with L abduction. UE ANDT: All within normal limits B  Strength: 5/5 throughout for B arms except 4/5 L ER which also is painful. Palpation: no TTP throughout L shoulder  DASH: 29/55    10/27/20:   5/5 L elbow flexion and extension, 5/5 L IR and abduction. 3+/5 L ER with tenderness. 11/10/20: 4+/5 L ER. Mild discomfort with ER in abduction and standing forward press   TREATMENT:   THERAPEUTIC ACTIVITY: (see chart for minutes): Therapeutic activities per grid below to improve mobility, strength, balance and coordination. Required minimal visual, verbal and tactile cues to improve independence with functional mobility and activities. THERAPEUTIC EXERCISE: (see chart for minutes):  Exercises per grid below to improve mobility, strength, balance and coordination. Required minimal visual, verbal and tactile cues to promote proper body alignment and promote proper body mechanics. Progressed resistance, range, repetitions and complexity of movement as indicated. NEUROMUSCULAR RE-EDUCATION: (see chart for minutes):  Exercise/activities per grid below to improve balance, coordination, kinesthetic sense, posture, pain, and proprioception. Required minimal visual, verbal and tactile cues to promote motor control of left shoulder. MANUAL THERAPY: (see chart for minutes): Joint mobilization, Soft tissue mobilization, skin mobilization, and muscle energy techniques were utilized and necessary because of the patient's restricted joint motion, restricted motion of soft tissue and pain . MODALITIES: (see chart for minutes):      *  Cold Pack Therapy in order to provide analgesia.      Date: 11/19/20 (visit 13)  12/2/20 (visit 14)  12/3/20 (visit 15)  12/8/20 (visit 16)     Modalities:                Therapeutic Exercise: 45 min 43 min 40 min 17 min     UBE: 3'/3' L4  Standing flexion: 2x10x7# B  Standing abduction: 2x10x7# B  [place] press: 10x7# B  Standing press: [id number]# bar  PU: x10   1-arm dominant push-ups: 2x3 B   Shoulder circles with 9# bar: 2x5 B  S/L ER: 2x10x7# L  Median nerve mobilization: 2x10. Pt given printout . Standing flexion: 2x10x8# B  Standing abduction: 2x10x8# B  Standing press: [id number]# bar  PU: 2x10   1-arm dominant push-ups: 2x3 B   Shoulder circles with 9# bar: 3x5 B  S/L ER: 2x10x8# L  Median nerve mobilization: x5. No neural tension anymore in any position. Bar swing: 10x9# B  UBE: 3'/3' L4  Dips: 3x5   BW row: 2x10   [place] press: 10x12# bar, no pain  Bar swing: 3x5x12# B  S/L ER: 3x15x5# L UBE: 3'/3' L4  Push-ups: x10  Plyometric push-ups: x9  [place] press: 10x9# bar  Thoracic bridge: x2 B    Neuromuscular re-education                Manual Therapy:                Therapeutic Activities:    30 min        Pt educated extensively on form with front fall, side fall, back fall, shoulder roll, and roll on back. Each was demonstrated 2-3 times/side. Front fall: 2x5  Side fall: 2x5 B  Back fall: 2x5   Shoulder roll: 2x3 B  Shoulder roll on back: 2x3 B  Pt reported no pain with any of these activities. Home program: 9/30/20: Band ER, pull-apart, and D2 flexion  10/13/20: thoracic bridge  11/19/20: add median nerve mobilization    MedBridge Portal  Treatment/Session Summary:    · Response to Treatment: Pt tolerated all exercises and activities well today. He reported he had no pain with fall training which involved both landing on side and using UEs to absorb impact. · Communication/Consultation:  None today  · Equipment provided today: Blue band  · Recommendations/Intent for next treatment session: Next visit will focus on improving L shoulder strength and activity tolerance.      Total Treatment Billable Duration:  47 minutes  PT Patient Time In/Time Out  Time In: 3643  Time Out: 301 [facility], PT, DPT    Future Appointments   Date Time Provider Kaylie Garner   12/15/2020  2:00 PM [facility]   12/17/2020  2:45 PM Vallerpolina Allen [facility]   12/21/2020  2:45 PM Valbrea Allen [facility]   12/28/2020  2:45 PM Valbrea Allen [facility]   12/30/2020  2:45 PM Vallerpolina Alejandro [facility]

## 2020-12-15 ENCOUNTER — HOSPITAL ENCOUNTER (OUTPATIENT)
Dept: PHYSICAL THERAPY | Age: 55
Discharge: HOME | End: 2020-12-15
Payer: COMMERCIAL

## 2020-12-15 PROCEDURE — 97110 THERAPEUTIC EXERCISES: CPT

## 2020-12-15 NOTE — PROGRESS NOTES
Oli Park  : 1965  Primary: Wilder Quintanarisk  Secondary:  Kaylah [address], [address] [place] [address]  [phone]   [phone]      OUTPATIENT PHYSICAL THERAPY: Daily Treatment Note 12/15/2020  Visit Count:  18    ICD-10: Treatment Diagnosis: Pain in left shoulder (M25.512) and Stiffness of left shoulder, not elsewhere classified (M25.612)  TREATMENT PLAN:  Effective Dates: 12/10/2020 TO 2020. Frequency/Duration: 2 times a week for 2 weeks   GOALS: (Goals have been discussed and agreed upon with patient.)  Short-Term Functional Goals: Time Frame: 2 weeks  # Goal Status   1 Pt will confirm compliance with home program to facilitate improvement in function. 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 He estimates he is back to ~75-85% function on L compared to R  11/10/20: 4+/5 L ER in adduction. Pt still with discomfort performing ER in abduction and loaded flexion. 12/10/20: pt improving L shoulder strength significantly and able to perform S/L ER with 5 lb now. He reports shoulder will throb 1-2/day  PROGRESSING       Pre-treatment Symptoms/Complaints:  Pt reports his shoulder has generally been doing well but he did sleep on it wrong last night so it is a little sore today. He reports that he was actually able to sleep on L shoulder for several hours, which did make it sore, but previously he could not sleep at all on shoulder.    Pain: Initial:   0/10, but some soreness in shoulder Post Session:  0/10, pt reported shoulder felt loosened up   Medications Last Reviewed: 12/15/2020  Updated Objective Findings: Below measures from initial evaluation unless otherwise noted.  9/30/20  Pain at worst: 5/10  Observation: Unremarkable  ROM:    Left Right   Elevation (°) 150  154   Behind neck reach To upper back To upper back   Behind back reach To TLJ  To TLJ   Cross body reach To opposite scapula To opposite scapula   Pt reports mild pain with L abduction. UE ANDT: All within normal limits B  Strength: 5/5 throughout for B arms except 4/5 L ER which also is painful. Palpation: no TTP throughout L shoulder  DASH: 29/55    10/27/20:   5/5 L elbow flexion and extension, 5/5 L IR and abduction. 3+/5 L ER with tenderness. 11/10/20: 4+/5 L ER. Mild discomfort with ER in abduction and standing forward press   TREATMENT:   THERAPEUTIC ACTIVITY: (see chart for minutes): Therapeutic activities per grid below to improve mobility, strength, balance and coordination. Required minimal visual, verbal and tactile cues to improve independence with functional mobility and activities. THERAPEUTIC EXERCISE: (see chart for minutes):  Exercises per grid below to improve mobility, strength, balance and coordination. Required minimal visual, verbal and tactile cues to promote proper body alignment and promote proper body mechanics. Progressed resistance, range, repetitions and complexity of movement as indicated. NEUROMUSCULAR RE-EDUCATION: (see chart for minutes):  Exercise/activities per grid below to improve balance, coordination, kinesthetic sense, posture, pain, and proprioception. Required minimal visual, verbal and tactile cues to promote motor control of left shoulder. MANUAL THERAPY: (see chart for minutes): Joint mobilization, Soft tissue mobilization, skin mobilization, and muscle energy techniques were utilized and necessary because of the patient's restricted joint motion, restricted motion of soft tissue and pain . MODALITIES: (see chart for minutes):      *  Cold Pack Therapy in order to provide analgesia.      Date: 11/19/20 (visit 13)  12/2/20 (visit 14)  12/3/20 (visit 15)  12/8/20 (visit 16)  12/15/20 (visit 17)    Modalities:                Therapeutic Exercise: 45 min 43 min 40 min 17 min 40 min    UBE: 3'/3' L4  Standing flexion: 2x10x7# B  Standing abduction: 2x10x7# B  [place] press: 10x7# B  Standing press: [id number]# bar  PU: x10   1-arm dominant push-ups: 2x3 B   Shoulder circles with 9# bar: 2x5 B  S/L ER: 2x10x7# L  Median nerve mobilization: 2x10. Pt given printout . Standing flexion: 2x10x8# B  Standing abduction: 2x10x8# B  Standing press: [id number]# bar  PU: 2x10   1-arm dominant push-ups: 2x3 B   Shoulder circles with 9# bar: 3x5 B  S/L ER: 2x10x8# L  Median nerve mobilization: x5. No neural tension anymore in any position. Bar swing: 10x9# B  UBE: 3'/3' L4  Dips: 3x5   BW row: 2x10   [place] press: 10x12# bar, no pain  Bar swing: 3x5x12# B  S/L ER: 3x15x5# L UBE: 3'/3' L4  Push-ups: x10  Plyometric push-ups: x9  [place] press: 10x9# bar  Thoracic bridge: x2 B UBE: 3'/3' L4  [place] press: 2x10x9#  Behind back swing with stick: x5 B with 9#  Downward chop with 9# bar: x5 B  Upward chop with 9# bar: x5 B  Reverse downward chop with 9# bar: x5 B  Reverse downward chop + upward follow through with 9# bar: x5 B, 2x5 B followed by opposite side downward chop. x5 B full sequence followed by swing over head    Neuromuscular re-education                Manual Therapy:                Therapeutic Activities:    30 min        Pt educated extensively on form with front fall, side fall, back fall, shoulder roll, and roll on back. Each was demonstrated 2-3 times/side. Front fall: 2x5  Side fall: 2x5 B  Back fall: 2x5   Shoulder roll: 2x3 B  Shoulder roll on back: 2x3 B  Pt reported no pain with any of these activities. Home program: 9/30/20: Band ER, pull-apart, and D2 flexion  10/13/20: thoracic bridge  11/19/20: add median nerve mobilization    [facility] Portal  Treatment/Session Summary:    · Response to Treatment: Pt tolerated exercises well today.  Swinging using stick was implemented today to emphasize control and full ROM with shoulder and pt tolerated this well. · Communication/Consultation:  None today  · Equipment provided today: Blue band  · Recommendations/Intent for next treatment session: Next visit will focus on improving L shoulder strength and activity tolerance.      Total Treatment Billable Duration:  40 minutes  PT Patient Time In/Time Out  Time In: 1400  Time Out: 800 Wiliam Henriquez, DPT    Future Appointments   Date Time Provider Kaylie Garner   12/17/2020  7:00 PM Razia Willams [facility]   12/18/2020 11:00 AM Razia Willams [facility]   12/21/2020  2:45 PM Razia CLEMENTE [facility]   12/28/2020  2:45 PM Razia CLEMENTE [facility]   12/30/2020  2:45 PM Razia CLEMENTE [facility]

## 2020-12-17 ENCOUNTER — APPOINTMENT (OUTPATIENT)
Dept: PHYSICAL THERAPY | Age: 55
End: 2020-12-17
Payer: COMMERCIAL

## 2020-12-18 ENCOUNTER — HOSPITAL ENCOUNTER (OUTPATIENT)
Dept: PHYSICAL THERAPY | Age: 55
Discharge: HOME | End: 2020-12-18
Payer: COMMERCIAL

## 2020-12-18 PROCEDURE — 97110 THERAPEUTIC EXERCISES: CPT

## 2020-12-18 NOTE — PROGRESS NOTES
Nasra Player  : 1965  Primary: Syeda Alvares  [facility]  [facility] [address]  [phone]   [phone]      OUTPATIENT PHYSICAL THERAPY: Daily Treatment and progress  Note 2020  Visit Count:  19    ICD-10: Treatment Diagnosis: Pain in left shoulder (M25.512) and Stiffness of left shoulder, not elsewhere classified (M25.612)  TREATMENT PLAN:  Effective Dates: 2020 TO 2021. Frequency/Duration: 2 times a week for 6 weeks   GOALS: (Goals have been discussed and agreed upon with patient.)  Short-Term Functional Goals: Time Frame: 2 weeks  # Goal Status   1 Pt will confirm compliance with home program to facilitate improvement in function. 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 He estimates he is back to ~75-85% function on L compared to R  11/10/20: 4+/5 L ER in adduction. Pt still with discomfort performing ER in abduction and loaded flexion. 12/10/20: pt improving L shoulder strength significantly and able to perform S/L ER with 5 lb now. He reports shoulder will throb 1-2/day  PROGRESSING   2 Pt will have L ER strength within at least 10% of R shoulder to be able to return to PLOF. NEW on 20       Pre-treatment Symptoms/Complaints:  Pt reports he saw MD yesterday who examined his shoulder. He reports that MD told him he had progressed well and has likely avoided needed a rotator cuff repair. 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  Pt reports shoulder has been improving though every now and then he feels the weakness in his shoulder such as loading heavier objects into truck. Pain: Initial:   0/10 though some stiffness  Post Session:  0/10   Medications Last Reviewed: 12/18/2020  Updated Objective Findings: Below measures from initial evaluation unless otherwise noted. 9/30/20  Pain at worst: 5/10  Observation: Unremarkable  ROM:    Left Right   Elevation (°) 150  154   Behind neck reach To upper back To upper back   Behind back reach To TLJ  To TLJ   Cross body reach To opposite scapula To opposite scapula   Pt reports mild pain with L abduction. UE ANDT: All within normal limits B  Strength: 5/5 throughout for B arms except 4/5 L ER which also is painful. Palpation: no TTP throughout L shoulder  DASH: 29/55    10/27/20:   5/5 L elbow flexion and extension, 5/5 L IR and abduction. 3+/5 L ER with tenderness. 11/10/20: 4+/5 L ER. Mild discomfort with ER in abduction and standing forward press     12/18/20:   Strength tested using dynamometer with strap used to transfer force with isometric ER in adduction. R ER strength, average over 3 trials: 30 lb  L ER strength, average over 3 trials: 20 lb   TREATMENT:   THERAPEUTIC ACTIVITY: (see chart for minutes): Therapeutic activities per grid below to improve mobility, strength, balance and coordination. Required minimal visual, verbal and tactile cues to improve independence with functional mobility and activities. THERAPEUTIC EXERCISE: (see chart for minutes):  Exercises per grid below to improve mobility, strength, balance and coordination. Required minimal visual, verbal and tactile cues to promote proper body alignment and promote proper body mechanics. Progressed resistance, range, repetitions and complexity of movement as indicated. NEUROMUSCULAR RE-EDUCATION: (see chart for minutes):  Exercise/activities per grid below to improve balance, coordination, kinesthetic sense, posture, pain, and proprioception. Required minimal visual, verbal and tactile cues to promote motor control of left shoulder.   MANUAL THERAPY: (see chart for minutes): Joint mobilization, Soft tissue mobilization, skin mobilization, and muscle energy techniques were utilized and necessary because of the patient's restricted joint motion, restricted motion of soft tissue and pain . MODALITIES: (see chart for minutes):      *  Cold Pack Therapy in order to provide analgesia. Date: 12/15/20 (visit 17)  12/18/20 (visit 18)       Modalities:                Therapeutic Exercise: 40 min 45 min       UBE: 3'/3' L4  [place] press: 2x10x9#  Behind back swing with stick: x5 B with 9#  Downward chop with 9# bar: x5 B  Upward chop with 9# bar: x5 B  Reverse downward chop with 9# bar: x5 B  Reverse downward chop + upward follow through with 9# bar: x5 B, 2x5 B followed by opposite side downward chop. x5 B full sequence followed by swing over head  UBE: 3'/3' L4  Shoulder raises: [id number]# B anterior  Cable row: 10x25# B, 10x35# B   Cable IR: 3v09p74# B  Cable ER: [id number]# B  Shot put ball throw with 2# ball: x1 max throw  Overhand throw into wall with 1# ball: x6 L, x4 L aiming at cone  Pt educated on continuing ER exercises at home. Neuromuscular re-education                Manual Therapy:                Therapeutic Activities:                  Home program: 9/30/20: Band ER, pull-apart, and D2 flexion  10/13/20: thoracic bridge  11/19/20: add median nerve mobilization    [facility] Portal  Treatment/Session Summary:  · Response to Treatment: Pt has continued to progress well. He demonstrates improvement in L shoulder pain, strength, and activity tolerance. He continues to have L ER strength deficit, with L ER 67% of R ER. 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 He will continue to benefit from skilled physical therapy to address these limitations.    · Communication/Consultation:  None today  · Equipment provided today: Blue band  · Recommendations/Intent for next treatment session: Next visit will focus on improving L shoulder ER strength and activity tolerance such as throwing.     Total Treatment Billable Duration:  50 minutes  PT Patient Time In/Time Out  Time In: 1110  Time Out: Natalia Cruz, PT, DPT    Future Appointments   Date Time Provider Kaylie Garner   12/21/2020  2:45 PM Jesus Amezcua [facility]   12/28/2020  2:45 PM Jesus Amezcua [facility]   12/30/2020  2:45 PM Jesus CLEMENTE [facility]

## 2020-12-21 ENCOUNTER — APPOINTMENT (OUTPATIENT)
Dept: PHYSICAL THERAPY | Age: 55
End: 2020-12-21
Payer: COMMERCIAL

## 2020-12-28 ENCOUNTER — HOSPITAL ENCOUNTER (OUTPATIENT)
Dept: PHYSICAL THERAPY | Age: 55
Discharge: HOME | End: 2020-12-28
Payer: COMMERCIAL

## 2021-01-06 ENCOUNTER — HOSPITAL ENCOUNTER (OUTPATIENT)
Dept: PHYSICAL THERAPY | Age: 56
Discharge: HOME | End: 2021-01-06
Payer: COMMERCIAL

## 2021-01-06 PROCEDURE — 97110 THERAPEUTIC EXERCISES: CPT

## 2021-01-06 NOTE — PROGRESS NOTES
Moe Calebjohn  : 1965  Primary: Karrie Alvares  Secondary:  [facility]  [address]  [phone]   [phone]      OUTPATIENT PHYSICAL THERAPY: Daily Treatment and progress  Note 2021  Visit Count:  20    ICD-10: Treatment Diagnosis: Pain in left shoulder (M25.512) and Stiffness of left shoulder, not elsewhere classified (M25.612)  TREATMENT PLAN:  Effective Dates: 2020 TO 2021. Frequency/Duration: 2 times a week for 6 weeks   GOALS: (Goals have been discussed and agreed upon with patient.)  Short-Term Functional Goals: Time Frame: 2 weeks  # Goal Status   1 Pt will confirm compliance with home program to facilitate improvement in function. 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 He estimates he is back to ~75-85% function on L compared to R  11/10/20: 4+/5 L ER in adduction. Pt still with discomfort performing ER in abduction and loaded flexion. 12/10/20: pt improving L shoulder strength significantly and able to perform S/L ER with 5 lb now. He reports shoulder will throb 1-2/day  PROGRESSING   2 Pt will have L ER strength within at least 10% of R shoulder to be able to return to PLOF. NEW on 20       Pre-treatment Symptoms/Complaints:  Pt reports he has had a busy past 2 weeks so he hasn't been able to do as much of the exercises as before. He reports shoulder is slowly improving and he is able to sleep between 6-7.5 hours at night now, whereas normal is 8 hours.    Pain: Initial:   0/10  Post Session:  0/10   Medications Last Reviewed: 2021  Updated Objective Findings: Below measures from initial evaluation unless otherwise noted.  9/30/20  Pain at worst: 5/10  Observation: Unremarkable  ROM:    Left Right   Elevation (°) 150  154   Behind neck reach To upper back To upper back   Behind back reach To TLJ  To TLJ   Cross body reach To opposite scapula To opposite scapula   Pt reports mild pain with L abduction. UE ANDT: All within normal limits B  Strength: 5/5 throughout for B arms except 4/5 L ER which also is painful. Palpation: no TTP throughout L shoulder  DASH: 29/55    10/27/20:   5/5 L elbow flexion and extension, 5/5 L IR and abduction. 3+/5 L ER with tenderness. 11/10/20: 4+/5 L ER. Mild discomfort with ER in abduction and standing forward press     12/18/20:   Strength tested using dynamometer with strap used to transfer force with isometric ER in adduction. R ER strength, average over 3 trials: 30 lb  L ER strength, average over 3 trials: 20 lb   TREATMENT:   THERAPEUTIC ACTIVITY: (see chart for minutes): Therapeutic activities per grid below to improve mobility, strength, balance and coordination. Required minimal visual, verbal and tactile cues to improve independence with functional mobility and activities. THERAPEUTIC EXERCISE: (see chart for minutes):  Exercises per grid below to improve mobility, strength, balance and coordination. Required minimal visual, verbal and tactile cues to promote proper body alignment and promote proper body mechanics. Progressed resistance, range, repetitions and complexity of movement as indicated. NEUROMUSCULAR RE-EDUCATION: (see chart for minutes):  Exercise/activities per grid below to improve balance, coordination, kinesthetic sense, posture, pain, and proprioception. Required minimal visual, verbal and tactile cues to promote motor control of left shoulder.   MANUAL THERAPY: (see chart for minutes): Joint mobilization, Soft tissue mobilization, skin mobilization, and muscle energy techniques were utilized and necessary because of the patient's restricted joint motion, restricted motion of soft tissue and pain . MODALITIES: (see chart for minutes):      *  Cold Pack Therapy in order to provide analgesia. Date: 12/15/20 (visit 17)  12/18/20 (visit 18)  1/6/21 (visit 19)      Modalities:                Therapeutic Exercise: 40 min 45 min 43 min      UBE: 3'/3' L4  [place] press: 2x10x9#  Behind back swing with stick: x5 B with 9#  Downward chop with 9# bar: x5 B  Upward chop with 9# bar: x5 B  Reverse downward chop with 9# bar: x5 B  Reverse downward chop + upward follow through with 9# bar: x5 B, 2x5 B followed by opposite side downward chop. x5 B full sequence followed by swing over head  UBE: 3'/3' L4  Shoulder raises: [id number]# B anterior  Cable row: 10x25# B, 10x35# B   Cable IR: [id number]# B  Cable ER: [id number]# B  Shot put ball throw with 2# ball: x1 max throw  Overhand throw into wall with 1# ball: x6 L, x4 L aiming at cone  Pt educated on continuing ER exercises at home. UBE: 3'/3' L4  [place] press: 10x5#  Trudell Doyne throw: 2x6 into wall  's walk: x2 laps B with 20# KB  Body blade: 2x5 IR/ER in adduction B, 2x3 throwing motion B, 2x5 B lateral shoulder raise   Lawnmower exercise: 10x5# B  Cable ER: [id number]# B     Neuromuscular re-education                Manual Therapy:                Therapeutic Activities:                  Home program: 9/30/20: Band ER, pull-apart, and D2 flexion  10/13/20: thoracic bridge  11/19/20: add median nerve mobilization    [facility] Portal  Treatment/Session Summary:  · Response to Treatment: Pt tolerated exercises well and reported none of them hurt his shoulder, though they were fatiguing. · Communication/Consultation:  None today  · Equipment provided today: Blue band  · Recommendations/Intent for next treatment session: Next visit will focus on improving L shoulder ER strength and activity tolerance such as throwing.     Total Treatment Billable Duration:  43 minutes  PT Patient Time In/Time Out  Time In: 1400  Time Out: [address] Santos Hoffman, DOMINIQUET    Future Appointments   Date Time Provider Kaylie Garner   1/8/2021 11:45 AM Mook Leger [facility]

## 2021-01-08 ENCOUNTER — HOSPITAL ENCOUNTER (OUTPATIENT)
Dept: PHYSICAL THERAPY | Age: 56
Discharge: HOME | End: 2021-01-08
Payer: COMMERCIAL

## 2021-01-08 PROCEDURE — 97110 THERAPEUTIC EXERCISES: CPT

## 2021-01-08 NOTE — PROGRESS NOTES
Rose Hanks  : 1965  Primary: Adrian Quintanarisk  Secondary:  [place] [id number] [facility], [facility] [address].  [phone]   [phone]      OUTPATIENT PHYSICAL THERAPY: Daily Treatment and progress  Note 2021  Visit Count:  21    ICD-10: Treatment Diagnosis: Pain in left shoulder (M25.512) and Stiffness of left shoulder, not elsewhere classified (M25.612)  TREATMENT PLAN:  Effective Dates: 2020 TO 2021. Frequency/Duration: 2 times a week for 6 weeks   GOALS: (Goals have been discussed and agreed upon with patient.)  Short-Term Functional Goals: Time Frame: 2 weeks  # Goal Status   1 Pt will confirm compliance with home program to facilitate improvement in function. 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 He estimates he is back to ~75-85% function on L compared to R  11/10/20: 4+/5 L ER in adduction. Pt still with discomfort performing ER in abduction and loaded flexion. 12/10/20: pt improving L shoulder strength significantly and able to perform S/L ER with 5 lb now. He reports shoulder will throb 1-2/day  PROGRESSING   2 Pt will have L ER strength within at least 10% of R shoulder to be able to return to PLOF. NEW on 20       Pre-treatment Symptoms/Complaints:  Pt reports he build his own body blade out of pvc pipe and has been working out with this. He reports his shoulder has been doing good with most of his activities but still some pain at night. Pain: Initial:   0/10  Post Session:  0/10   Medications Last Reviewed: 2021  Updated Objective Findings: Below measures from initial evaluation unless otherwise noted.   20  Pain at worst: 5/10  Observation: Unremarkable  ROM:    Left Right   Elevation (°) 150  154   Behind neck reach To upper back To upper back   Behind back reach To TLJ  To TLJ   Cross body reach To opposite scapula To opposite scapula   Pt reports mild pain with L abduction. UE ANDT: All within normal limits B  Strength: 5/5 throughout for B arms except 4/5 L ER which also is painful. Palpation: no TTP throughout L shoulder  DASH: 29/55    10/27/20:   5/5 L elbow flexion and extension, 5/5 L IR and abduction. 3+/5 L ER with tenderness. 11/10/20: 4+/5 L ER. Mild discomfort with ER in abduction and standing forward press     12/18/20:   Strength tested using dynamometer with strap used to transfer force with isometric ER in adduction. R ER strength, average over 3 trials: 30 lb  L ER strength, average over 3 trials: 20 lb   TREATMENT:   THERAPEUTIC ACTIVITY: (see chart for minutes): Therapeutic activities per grid below to improve mobility, strength, balance and coordination. Required minimal visual, verbal and tactile cues to improve independence with functional mobility and activities. THERAPEUTIC EXERCISE: (see chart for minutes):  Exercises per grid below to improve mobility, strength, balance and coordination. Required minimal visual, verbal and tactile cues to promote proper body alignment and promote proper body mechanics. Progressed resistance, range, repetitions and complexity of movement as indicated. NEUROMUSCULAR RE-EDUCATION: (see chart for minutes):  Exercise/activities per grid below to improve balance, coordination, kinesthetic sense, posture, pain, and proprioception. Required minimal visual, verbal and tactile cues to promote motor control of left shoulder. MANUAL THERAPY: (see chart for minutes): Joint mobilization, Soft tissue mobilization, skin mobilization, and muscle energy techniques were utilized and necessary because of the patient's restricted joint motion, restricted motion of soft tissue and pain . MODALITIES: (see chart for minutes):      *  Cold Pack Therapy in order to provide analgesia. Date: 12/15/20 (visit 17)  12/18/20 (visit 18)  1/6/21 (visit 19)  1/8/21 (visit 20)     Modalities:                Therapeutic Exercise: 40 min 45 min 43 min 42 min     UBE: 3'/3' L4  [place] press: 2x10x9#  Behind back swing with stick: x5 B with 9#  Downward chop with 9# bar: x5 B  Upward chop with 9# bar: x5 B  Reverse downward chop with 9# bar: x5 B  Reverse downward chop + upward follow through with 9# bar: x5 B, 2x5 B followed by opposite side downward chop. x5 B full sequence followed by swing over head  UBE: 3'/3' L4  Shoulder raises: [id number]# B anterior  Cable row: 10x25# B, 10x35# B   Cable IR: [id number]# B  Cable ER: 3m29a92# B  Shot put ball throw with 2# ball: x1 max throw  Overhand throw into wall with 1# ball: x6 L, x4 L aiming at cone  Pt educated on continuing ER exercises at home. UBE: 3'/3' L4  [place] press: 10x5#  Everrett Hooksett throw: 2x6 into wall  's walk: x2 laps B with 20# KB  Body blade: 2x5 IR/ER in adduction B, 2x3 throwing motion B, 2x5 B lateral shoulder raise   Lawnmower exercise: 10x5# B  Cable ER: [id number]# B UBE: 3'/3' L4  Face pull: 2x10 blue  Ball throw: 2x6 into wall on target   's walk: 2x2 laps B with 20# KB then 1 lap pressing overhead   Body blade: x5 IR/ER in adduction B, x5 IR/ER in abduction B, x5 lateral raise, x5 horizontal abduction  Cable ER: [id number]#     Neuromuscular re-education                Manual Therapy:                Therapeutic Activities:                  Home program: 9/30/20: Band ER, pull-apart, and D2 flexion  10/13/20: thoracic bridge  11/19/20: add median nerve mobilization    MedBridge Portal  Treatment/Session Summary:  · Response to Treatment: Pt was challenged with exercises and educated to continue working on them and emphasizing ER strengthening.   · Communication/Consultation:  None today  · Equipment provided today: Blue band  · Recommendations/Intent for next treatment session: Next visit will focus on improving L shoulder ER strength and activity tolerance such as throwing. Total Treatment Billable Duration:  42 minutes  PT Patient Time In/Time Out  Time In: 1140  Time Out: [address] Garcia, PT, DPT    No future appointments.

## 2021-01-12 ENCOUNTER — HOSPITAL ENCOUNTER (OUTPATIENT)
Dept: PHYSICAL THERAPY | Age: 56
Discharge: HOME | End: 2021-01-12
Payer: COMMERCIAL

## 2021-01-12 PROCEDURE — 97110 THERAPEUTIC EXERCISES: CPT

## 2021-01-12 NOTE — PROGRESS NOTES
Getachew Bookbinder  : 1965  Primary: Osmar Alvares  Secondary:  Darline Hawyard  [facility] [address]  [phone]   [phone]      OUTPATIENT PHYSICAL THERAPY: Daily Treatment Note 2021  Visit Count:  22    ICD-10: Treatment Diagnosis: Pain in left shoulder (M25.512) and Stiffness of left shoulder, not elsewhere classified (M25.612)  TREATMENT PLAN:  Effective Dates: 2020 TO 2021. Frequency/Duration: 2 times a week for 6 weeks   GOALS: (Goals have been discussed and agreed upon with patient.)  Short-Term Functional Goals: Time Frame: 2 weeks  # Goal Status   1 Pt will confirm compliance with home program to facilitate improvement in function. 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 He estimates he is back to ~75-85% function on L compared to R  11/10/20: 4+/5 L ER in adduction. Pt still with discomfort performing ER in abduction and loaded flexion. 12/10/20: pt improving L shoulder strength significantly and able to perform S/L ER with 5 lb now. He reports shoulder will throb 1-2/day  PROGRESSING   2 Pt will have L ER strength within at least 10% of R shoulder to be able to return to PLOF. NEW on 20       Pre-treatment Symptoms/Complaints:  Pt reports he has been practicing throwing at home to improve power. He reports shoulder has been doing well and he has been doing a shoulder routine before bed to help him sleep better. Pain: Initial:   0/10  Post Session:  0/10   Medications Last Reviewed: 2021  Updated Objective Findings: Below measures from initial evaluation unless otherwise noted.   20  Pain at worst: 5/10  Observation: Unremarkable  ROM: Left Right   Elevation (°) 150  154   Behind neck reach To upper back To upper back   Behind back reach To TLJ  To TLJ   Cross body reach To opposite scapula To opposite scapula   Pt reports mild pain with L abduction. UE ANDT: All within normal limits B  Strength: 5/5 throughout for B arms except 4/5 L ER which also is painful. Palpation: no TTP throughout L shoulder  DASH: 29/55    10/27/20:   5/5 L elbow flexion and extension, 5/5 L IR and abduction. 3+/5 L ER with tenderness. 11/10/20: 4+/5 L ER. Mild discomfort with ER in abduction and standing forward press     12/18/20:   Strength tested using dynamometer with strap used to transfer force with isometric ER in adduction. R ER strength, average over 3 trials: 30 lb  L ER strength, average over 3 trials: 20 lb   TREATMENT:   THERAPEUTIC ACTIVITY: (see chart for minutes): Therapeutic activities per grid below to improve mobility, strength, balance and coordination. Required minimal visual, verbal and tactile cues to improve independence with functional mobility and activities. THERAPEUTIC EXERCISE: (see chart for minutes):  Exercises per grid below to improve mobility, strength, balance and coordination. Required minimal visual, verbal and tactile cues to promote proper body alignment and promote proper body mechanics. Progressed resistance, range, repetitions and complexity of movement as indicated. NEUROMUSCULAR RE-EDUCATION: (see chart for minutes):  Exercise/activities per grid below to improve balance, coordination, kinesthetic sense, posture, pain, and proprioception. Required minimal visual, verbal and tactile cues to promote motor control of left shoulder. MANUAL THERAPY: (see chart for minutes): Joint mobilization, Soft tissue mobilization, skin mobilization, and muscle energy techniques were utilized and necessary because of the patient's restricted joint motion, restricted motion of soft tissue and pain .    MODALITIES: (see chart for minutes):      *  Cold Pack Therapy in order to provide analgesia. Date: 12/15/20 (visit 17)  12/18/20 (visit 18)  1/6/21 (visit 19)  1/8/21 (visit 20)  1/12/21 (visit 21)    Modalities:                Therapeutic Exercise: 40 min 45 min 43 min 42 min 42 min    UBE: 3'/3' L4  [place] press: 2x10x9#  Behind back swing with stick: x5 B with 9#  Downward chop with 9# bar: x5 B  Upward chop with 9# bar: x5 B  Reverse downward chop with 9# bar: x5 B  Reverse downward chop + upward follow through with 9# bar: x5 B, 2x5 B followed by opposite side downward chop. x5 B full sequence followed by swing over head  UBE: 3'/3' L4  Shoulder raises: 3b75x79# B anterior  Cable row: 10x25# B, 10x35# B   Cable IR: [id number]# B  Cable ER: [id number]# B  Shot put ball throw with 2# ball: x1 max throw  Overhand throw into wall with 1# ball: x6 L, x4 L aiming at cone  Pt educated on continuing ER exercises at home.   UBE: 3'/3' L4  [place] press: 10x5#  Ball throw: 2x6 into wall  's walk: x2 laps B with 20# KB  Body blade: 2x5 IR/ER in adduction B, 2x3 throwing motion B, 2x5 B lateral shoulder raise   Lawnmower exercise: 10x5# B  Cable ER: [id number]# B UBE: 3'/3' L4  Face pull: 2x10 blue  Ball throw: 2x6 into wall on target   's walk: 2x2 laps B with 20# KB then 1 lap pressing overhead   Body blade: x5 IR/ER in adduction B, x5 IR/ER in abduction B, x5 lateral raise, x5 horizontal abduction  Cable ER: [id number]#  UBE: 3'/3' L4  Face pull: 2x10 blue  Overhead lunges: x2 laps with 24# bar   's walk: 2x2 laps B with 24# bar then 1 lap pressing overhead   Behind back swing: 3x5 B with 24# bar  Cable ER: 10x10#, 10x12.5#, 10x15# B  Cable IR: [id number]# B   Neuromuscular re-education                Manual Therapy:                Therapeutic Activities:                  Home program: 9/30/20: Band ER, pull-apart, and D2 flexion  10/13/20: thoracic bridge  11/19/20: add median nerve mobilization    MedBridge Portal  Treatment/Session Summary:  · Response to Treatment: Pt was able to perform overhead single arm carry with bar today and reported it was challenging but not painful. He was able to perform cable ER with greater weight today. · Communication/Consultation:  None today  · Equipment provided today: Blue band  · Recommendations/Intent for next treatment session: Next visit will focus on improving L shoulder ER strength and activity tolerance such as throwing.     Total Treatment Billable Duration:  42 minutes  PT Patient Time In/Time Out  Time In: 9915  Time Out: [address] [facility] Vesna Garcia PT, DPT    Future Appointments   Date Time Provider Kaylie Garner   1/14/2021  4:15 PM [facility]   1/20/2021  3:30 PM [facility]   1/22/2021  2:00 PM [facility]   1/26/2021  4:15 PM [facility]   1/28/2021  4:15 PM [facility]

## 2021-01-14 ENCOUNTER — HOSPITAL ENCOUNTER (OUTPATIENT)
Dept: PHYSICAL THERAPY | Age: 56
Discharge: HOME | End: 2021-01-14
Payer: COMMERCIAL

## 2021-01-14 PROCEDURE — 97110 THERAPEUTIC EXERCISES: CPT

## 2021-01-14 NOTE — PROGRESS NOTES
Maria Victoria Cooper  : 1965  Primary: Aurelia Alvares  Secondary:  Sanaz Vu  [address] [facility], [place] [address].  [phone]   [phone]      OUTPATIENT PHYSICAL THERAPY: Daily Treatment Note 2021  Visit Count:  23    ICD-10: Treatment Diagnosis: Pain in left shoulder (M25.512) and Stiffness of left shoulder, not elsewhere classified (M25.612)  TREATMENT PLAN:  Effective Dates: 2020 TO 2021. Frequency/Duration: 2 times a week for 6 weeks   GOALS: (Goals have been discussed and agreed upon with patient.)  Short-Term Functional Goals: Time Frame: 2 weeks  # Goal Status   1 Pt will confirm compliance with home program to facilitate improvement in function. 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 He estimates he is back to ~75-85% function on L compared to R  11/10/20: 4+/5 L ER in adduction. Pt still with discomfort performing ER in abduction and loaded flexion. 12/10/20: pt improving L shoulder strength significantly and able to perform S/L ER with 5 lb now. He reports shoulder will throb 1-2/day  PROGRESSING   2 Pt will have L ER strength within at least 10% of R shoulder to be able to return to PLOF. NEW on 20       Pre-treatment Symptoms/Complaints:  Pt reports he thinks his night routine is helping his shoulder with sleeping. He reports shoulder is feeling good and not sore from last session. Pain: Initial:   0/10  Post Session:  0/10   Medications Last Reviewed: 2021  Updated Objective Findings: Below measures from initial evaluation unless otherwise noted.   20  Pain at worst: 5/10  Observation: Unremarkable  ROM:    Left Right   Elevation (°) 150  154 Behind neck reach To upper back To upper back   Behind back reach To TLJ  To TLJ   Cross body reach To opposite scapula To opposite scapula   Pt reports mild pain with L abduction. UE ANDT: All within normal limits B  Strength: 5/5 throughout for B arms except 4/5 L ER which also is painful. Palpation: no TTP throughout L shoulder  DASH: 29/55    10/27/20:   5/5 L elbow flexion and extension, 5/5 L IR and abduction. 3+/5 L ER with tenderness. 11/10/20: 4+/5 L ER. Mild discomfort with ER in abduction and standing forward press     12/18/20:   Strength tested using dynamometer with strap used to transfer force with isometric ER in adduction. R ER strength, average over 3 trials: 30 lb  L ER strength, average over 3 trials: 20 lb   TREATMENT:   THERAPEUTIC ACTIVITY: (see chart for minutes): Therapeutic activities per grid below to improve mobility, strength, balance and coordination. Required minimal visual, verbal and tactile cues to improve independence with functional mobility and activities. THERAPEUTIC EXERCISE: (see chart for minutes):  Exercises per grid below to improve mobility, strength, balance and coordination. Required minimal visual, verbal and tactile cues to promote proper body alignment and promote proper body mechanics. Progressed resistance, range, repetitions and complexity of movement as indicated. NEUROMUSCULAR RE-EDUCATION: (see chart for minutes):  Exercise/activities per grid below to improve balance, coordination, kinesthetic sense, posture, pain, and proprioception. Required minimal visual, verbal and tactile cues to promote motor control of left shoulder. MANUAL THERAPY: (see chart for minutes): Joint mobilization, Soft tissue mobilization, skin mobilization, and muscle energy techniques were utilized and necessary because of the patient's restricted joint motion, restricted motion of soft tissue and pain .    MODALITIES: (see chart for minutes):      *  Cold Pack Therapy in order to provide analgesia. Date: 21 (visit 21)  21 (visit 22)       Modalities:                Therapeutic Exercise: 42 min 45 min       UBE: 3'/3' L4  Face pull: 2x10 blue  Overhead lunges: x2 laps with 24# bar   's walk: 2x2 laps B with 24# bar then 1 lap pressing overhead   Behind back swing: 3x5 B with 24# bar  Cable ER: 10x10#, 10x12.5#, 10x15# B  Cable IR: [id number]# B UBE: 3'/3' L4  Face pull: 2x10 blue  Bear crawl: 1 lap forward and backwards, 1 lap with elbows bent   Sideways crawl: 1 lap both directions  Sideways crawl hoppin lap both directions  Side plank with shoulder abduction: x5 B  Behind back swing: x10 both hands, 2x10 single arm B  Cable ER: [id number]# B  Cable IR: [id number]# B      Neuromuscular re-education                Manual Therapy:                Therapeutic Activities:                  Home program: 20: Band ER, pull-apart, and D2 flexion  10/13/20: thoracic bridge  20: add median nerve mobilization    MedBridge Portal  Treatment/Session Summary:  · Response to Treatment: Pt tolerated exercises well and reported they were challenging but not painful. · Communication/Consultation:  None today  · Equipment provided today: Blue band  · Recommendations/Intent for next treatment session: Next visit will focus on improving L shoulder ER strength and activity tolerance such as throwing.     Total Treatment Billable Duration:  45 minutes  PT Patient Time In/Time Out  Time In: 9515  Time Out: [address] [place] [address] Vesna Garcia, PT, DPT    Future Appointments   Date Time Provider Kaylie Garner   2021  3:30 PM [facility]   2021  2:00 PM [facility]   2021  4:15 PM [facility]   2021  4:15 PM [facility]

## 2021-01-20 ENCOUNTER — HOSPITAL ENCOUNTER (OUTPATIENT)
Dept: PHYSICAL THERAPY | Age: 56
Discharge: HOME | End: 2021-01-20
Payer: COMMERCIAL

## 2021-01-20 PROCEDURE — 97110 THERAPEUTIC EXERCISES: CPT

## 2021-01-20 NOTE — PROGRESS NOTES
Elijah Cardona  : 1965  Primary: Fabiola Alvares  Secondary:  Bert [facility]ath[facility]  [address] [place] [address].  [phone]   [phone]      OUTPATIENT PHYSICAL THERAPY: Daily Treatment Note 2021  Visit Count:  24    ICD-10: Treatment Diagnosis: Pain in left shoulder (M25.512) and Stiffness of left shoulder, not elsewhere classified (M25.612)  TREATMENT PLAN:  Effective Dates: 2020 TO 2021. Frequency/Duration: 2 times a week for 6 weeks   GOALS: (Goals have been discussed and agreed upon with patient.)  Short-Term Functional Goals: Time Frame: 2 weeks  # Goal Status   1 Pt will confirm compliance with home program to facilitate improvement in function. 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 He estimates he is back to ~75-85% function on L compared to R  11/10/20: 4+/5 L ER in adduction. Pt still with discomfort performing ER in abduction and loaded flexion. 12/10/20: pt improving L shoulder strength significantly and able to perform S/L ER with 5 lb now. He reports shoulder will throb 1-2/day  PROGRESSING   2 Pt will have L ER strength within at least 10% of R shoulder to be able to return to PLOF. NEW on 20       Pre-treatment Symptoms/Complaints:  Pt reports he has been using massage chair and he thinks it has been helping his shoulder. Pt arrived at [phone]. Pain: Initial:   0/10  Post Session:  0/10   Medications Last Reviewed: 2021  Updated Objective Findings: Below measures from initial evaluation unless otherwise noted.   20  Pain at worst: 5/10  Observation: Unremarkable  ROM:    Left Right   Elevation (°) 150  154   Behind neck reach To upper back To upper back   Behind back reach To TLJ  To TLJ   Cross body reach To opposite scapula To opposite scapula   Pt reports mild pain with L abduction. UE ANDT: All within normal limits B  Strength: 5/5 throughout for B arms except 4/5 L ER which also is painful. Palpation: no TTP throughout L shoulder  DASH: 29/55    10/27/20:   5/5 L elbow flexion and extension, 5/5 L IR and abduction. 3+/5 L ER with tenderness. 11/10/20: 4+/5 L ER. Mild discomfort with ER in abduction and standing forward press     12/18/20:   Strength tested using dynamometer with strap used to transfer force with isometric ER in adduction. R ER strength, average over 3 trials: 30 lb  L ER strength, average over 3 trials: 20 lb   TREATMENT:   THERAPEUTIC ACTIVITY: (see chart for minutes): Therapeutic activities per grid below to improve mobility, strength, balance and coordination. Required minimal visual, verbal and tactile cues to improve independence with functional mobility and activities. THERAPEUTIC EXERCISE: (see chart for minutes):  Exercises per grid below to improve mobility, strength, balance and coordination. Required minimal visual, verbal and tactile cues to promote proper body alignment and promote proper body mechanics. Progressed resistance, range, repetitions and complexity of movement as indicated. NEUROMUSCULAR RE-EDUCATION: (see chart for minutes):  Exercise/activities per grid below to improve balance, coordination, kinesthetic sense, posture, pain, and proprioception. Required minimal visual, verbal and tactile cues to promote motor control of left shoulder. MANUAL THERAPY: (see chart for minutes): Joint mobilization, Soft tissue mobilization, skin mobilization, and muscle energy techniques were utilized and necessary because of the patient's restricted joint motion, restricted motion of soft tissue and pain . MODALITIES: (see chart for minutes):      *  Cold Pack Therapy in order to provide analgesia. Date: 21 (visit 21)  21 (visit 22)  21 (visit 23)      Modalities:                Therapeutic Exercise: 42 min 45 min 38 min      UBE: 3'/3' L4  Face pull: 2x10 blue  Overhead lunges: x2 laps with 24# bar   's walk: 2x2 laps B with 24# bar then 1 lap pressing overhead   Behind back swing: 3x5 B with 24# bar  Cable ER: 10x10#, 10x12.5#, 10x15# B  Cable IR: [id number]# B UBE: 3'/3' L4  Face pull: 2x10 blue  Bear crawl: 1 lap forward and backwards, 1 lap with elbows bent   Sideways crawl: 1 lap both directions  Sideways crawl hoppin lap both directions  Side plank with shoulder abduction: x5 B  Behind back swing: x10 both hands, 2x10 single arm B  Cable ER: [id number]# B  Cable IR: [id number]# B UBE: 3'/3' L4  Face pull: 2x10 blue  Ball throw into wall: 3x6   Barbell overhead carry: x2 laps with 65#  Barbell rack carry: x2 with 65#  Shoulder carry with barbell: x2 laps with 65# B. Pt reported some sensitivity on L. Doorway stretch: x1 hold   Yoke carry: x1 lap with 65#  Cable ER: x10x15# B     Neuromuscular re-education                Manual Therapy:                Therapeutic Activities:                  Home program: 20: Band ER, pull-apart, and D2 flexion  10/13/20: thoracic bridge  20: add median nerve mobilization    [facility] Portal  Treatment/Session Summary:  · Response to Treatment: Pt reported throwing the ball was feeling good and challenging. He reported L shoulder was slightly sensitive with barbell on shoulder whereas R was not. · Communication/Consultation:  None today  · Equipment provided today: Blue band  · Recommendations/Intent for next treatment session: Next visit will focus on improving L shoulder ER strength and activity tolerance such as throwing.     Total Treatment Billable Duration:  38 minutes  PT Patient Time In/Time Out  Time In: 9842  Time Out: [facility], PT, DPT    Future Appointments   Date Time Provider Kaylie Garner   2021  2:00 PM Benjamin Donovan [facility]   1/26/2021  4:15 PM Benjamin CLEMENTE [facility]   1/28/2021  4:15 PM Benjamin CLEMENTE [facility]

## 2021-01-22 ENCOUNTER — HOSPITAL ENCOUNTER (OUTPATIENT)
Dept: PHYSICAL THERAPY | Age: 56
Discharge: HOME | End: 2021-01-22
Payer: COMMERCIAL

## 2021-01-26 ENCOUNTER — HOSPITAL ENCOUNTER (OUTPATIENT)
Dept: PHYSICAL THERAPY | Age: 56
Discharge: HOME | End: 2021-01-26
Payer: COMMERCIAL

## 2021-01-26 PROCEDURE — 97110 THERAPEUTIC EXERCISES: CPT

## 2021-01-26 NOTE — PROGRESS NOTES
Melly Matthews  : 1965  Primary: Marissa Alvares  Secondary:  Camryn Apodaca  [address] [address].  [phone]   [phone]      OUTPATIENT PHYSICAL THERAPY: Daily Treatment Note 2021  Visit Count:  25    ICD-10: Treatment Diagnosis: Pain in left shoulder (M25.512) and Stiffness of left shoulder, not elsewhere classified (M25.612)  TREATMENT PLAN:  Effective Dates: 2020 TO 2021. Frequency/Duration: 2 times a week for 6 weeks   GOALS: (Goals have been discussed and agreed upon with patient.)  Short-Term Functional Goals: Time Frame: 2 weeks  # Goal Status   1 Pt will confirm compliance with home program to facilitate improvement in function. 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 He estimates he is back to ~75-85% function on L compared to R  11/10/20: 4+/5 L ER in adduction. Pt still with discomfort performing ER in abduction and loaded flexion. 12/10/20: pt improving L shoulder strength significantly and able to perform S/L ER with 5 lb now. He reports shoulder will throb 1-2/day  PROGRESSING   2 Pt will have L ER strength within at least 10% of R shoulder to be able to return to PLOF. 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 He also reports he was able to sleep ~7 hours last night which is also an improvement. He follows up with MD Thursday.    Pain: Initial:   0/10  Post Session:  0/10   Medications Last Reviewed: 2021  Updated Objective Findings: Below measures from initial evaluation unless otherwise noted.  9/30/20  Pain at worst: 5/10  Observation: Unremarkable  ROM:    Left Right   Elevation (°) 150  154   Behind neck reach To upper back To upper back   Behind back reach To TLJ  To TLJ   Cross body reach To opposite scapula To opposite scapula   Pt reports mild pain with L abduction. UE ANDT: All within normal limits B  Strength: 5/5 throughout for B arms except 4/5 L ER which also is painful. Palpation: no TTP throughout L shoulder  DASH: 29/55    10/27/20:   5/5 L elbow flexion and extension, 5/5 L IR and abduction. 3+/5 L ER with tenderness. 11/10/20: 4+/5 L ER. Mild discomfort with ER in abduction and standing forward press     12/18/20:   Strength tested using dynamometer with strap used to transfer force with isometric ER in adduction. R ER strength, average over 3 trials: 30 lb  L ER strength, average over 3 trials: 20 lb   TREATMENT:   THERAPEUTIC ACTIVITY: (see chart for minutes): Therapeutic activities per grid below to improve mobility, strength, balance and coordination. Required minimal visual, verbal and tactile cues to improve independence with functional mobility and activities. THERAPEUTIC EXERCISE: (see chart for minutes):  Exercises per grid below to improve mobility, strength, balance and coordination. Required minimal visual, verbal and tactile cues to promote proper body alignment and promote proper body mechanics. Progressed resistance, range, repetitions and complexity of movement as indicated. NEUROMUSCULAR RE-EDUCATION: (see chart for minutes):  Exercise/activities per grid below to improve balance, coordination, kinesthetic sense, posture, pain, and proprioception. Required minimal visual, verbal and tactile cues to promote motor control of left shoulder.   MANUAL THERAPY: (see chart for minutes): Joint mobilization, Soft tissue mobilization, skin mobilization, and muscle energy techniques were utilized and necessary because of the patient's restricted joint motion, restricted motion of soft tissue and pain . MODALITIES: (see chart for minutes):      *  Cold Pack Therapy in order to provide analgesia. Date: 21 (visit 21)  21 (visit 22)  21 (visit 23)  21 (visit 24)     Modalities:                Therapeutic Exercise: 42 min 45 min 38 min 42 min     UBE: 3'/3' L4  Face pull: 2x10 blue  Overhead lunges: x2 laps with 24# bar   's walk: 2x2 laps B with 24# bar then 1 lap pressing overhead   Behind back swing: 3x5 B with 24# bar  Cable ER: 10x10#, 10x12.5#, 10x15# B  Cable IR: [id number]# B UBE: 3'/3' L4  Face pull: 2x10 blue  Bear crawl: 1 lap forward and backwards, 1 lap with elbows bent   Sideways crawl: 1 lap both directions  Sideways crawl hoppin lap both directions  Side plank with shoulder abduction: x5 B  Behind back swing: x10 both hands, 2x10 single arm B  Cable ER: [id number]# B  Cable IR: [id number]# B UBE: 3'/3' L4  Face pull: 2x10 blue  Ball throw into wall: 3x6   Barbell overhead carry: x2 laps with 65#  Barbell rack carry: x2 with 65#  Shoulder carry with barbell: x2 laps with 65# B. Pt reported some sensitivity on L. Doorway stretch: x1 hold   Yoke carry: x1 lap with 65#  Cable ER: x10x15# B Face pull: 2x10 black  Band pull-apart: 2x10 black band  Ball throw into wall: 3x6   Shoulder raises with 9# bar: x10 B anterior and lateral each  Shoulder carry with barbell: x2 laps with 65# B. Pt reported this felt better than last time.    Stick swing behind head with 9# bar: x10 both directions with 2 hands, x10 both directions with 1 hand  Cable ER: x10x15#, 10x10# B    Neuromuscular re-education                Manual Therapy:                Therapeutic Activities:                  Home program: 20: Band ER, pull-apart, and D2 flexion  10/13/20: thoracic bridge  20: add median nerve mobilization    [facility]  Treatment/Session Summary:  · Response to Treatment: Pt progressed to higher resistance with band ER in abduction and increased volume which he tolerated well. He was able to throw faster today and reported carrying weight on L shoulder felt better as well. · Communication/Consultation:  None today  · Equipment provided today: Blue band  · Recommendations/Intent for next treatment session: Next visit will focus on improving L shoulder ER strength and activity tolerance such as throwing.     Total Treatment Billable Duration:  42 minutes  PT Patient Time In/Time Out  Time In: 2352  Time Out: [address] Robby Marino, PT, DPT    Future Appointments   Date Time Provider Kaylie Garner   1/28/2021  4:15 PM Andry Saucedo [facility]

## 2021-01-28 ENCOUNTER — HOSPITAL ENCOUNTER (OUTPATIENT)
Dept: PHYSICAL THERAPY | Age: 56
Discharge: HOME | End: 2021-01-28
Payer: COMMERCIAL

## 2021-01-28 PROCEDURE — 97110 THERAPEUTIC EXERCISES: CPT

## 2021-01-28 NOTE — PROGRESS NOTES
Mariano Brandon  : 1965  Primary: Vinny Abrams [place]  Secondary:  [facility]  [facility] [address]  [phone]   [phone]      OUTPATIENT PHYSICAL THERAPY: Daily Treatment and discharge Note 2021  Visit Count:  26    ICD-10: Treatment Diagnosis: Pain in left shoulder (M25.512) and Stiffness of left shoulder, not elsewhere classified (M25.612)  TREATMENT PLAN:  Effective Dates: 2020 TO 2021. Frequency/Duration: 2 times a week for 6 weeks   GOALS: (Goals have been discussed and agreed upon with patient.)  Short-Term Functional Goals: Time Frame: 2 weeks  # Goal Status   1 Pt will confirm compliance with home program to facilitate improvement in function. 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 He estimates he is back to ~75-85% function on L compared to R  11/10/20: 4+/5 L ER in adduction. Pt still with discomfort performing ER in abduction and loaded flexion. 12/10/20: pt improving L shoulder strength significantly and able to perform S/L ER with 5 lb now. 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  21: R ER average 22.7 lb and L ER average 20 lb  NEW on 20  MET       Pre-treatment Symptoms/Complaints:  Pt reports he saw MD this afternoon who reported he has continued to get stronger every time he has seen him and that he is ready to be released.    Pain: Initial:   0/10  Post Session:  0/10   Medications Last Reviewed: 2021  Updated Objective Findings: Below measures from initial evaluation unless otherwise noted. 9/30/20  Pain at worst: 5/10  Observation: Unremarkable  ROM:    Left Right   Elevation (°) 150  154   Behind neck reach To upper back To upper back   Behind back reach To TLJ  To TLJ   Cross body reach To opposite scapula To opposite scapula   Pt reports mild pain with L abduction. UE ANDT: All within normal limits B  Strength: 5/5 throughout for B arms except 4/5 L ER which also is painful. Palpation: no TTP throughout L shoulder  DASH: 29/55    10/27/20:   5/5 L elbow flexion and extension, 5/5 L IR and abduction. 3+/5 L ER with tenderness. 11/10/20: 4+/5 L ER. Mild discomfort with ER in abduction and standing forward press     12/18/20:   Strength tested using dynamometer with strap used to transfer force with isometric ER in adduction. R ER strength, average over 3 trials: 30 lb  L ER strength, average over 3 trials: 20 lb     1/28/21  R ER strength, average over 3 trials: 22.7 lb  L ER strength, average over 3 trials: 20 lb  TREATMENT:   THERAPEUTIC ACTIVITY: (see chart for minutes): Therapeutic activities per grid below to improve mobility, strength, balance and coordination. Required minimal visual, verbal and tactile cues to improve independence with functional mobility and activities. THERAPEUTIC EXERCISE: (see chart for minutes):  Exercises per grid below to improve mobility, strength, balance and coordination. Required minimal visual, verbal and tactile cues to promote proper body alignment and promote proper body mechanics. Progressed resistance, range, repetitions and complexity of movement as indicated. NEUROMUSCULAR RE-EDUCATION: (see chart for minutes):  Exercise/activities per grid below to improve balance, coordination, kinesthetic sense, posture, pain, and proprioception. Required minimal visual, verbal and tactile cues to promote motor control of left shoulder.   MANUAL THERAPY: (see chart for minutes): Joint mobilization, Soft tissue mobilization, skin mobilization, and muscle energy techniques were utilized and necessary because of the patient's restricted joint motion, restricted motion of soft tissue and pain . MODALITIES: (see chart for minutes):      *  Cold Pack Therapy in order to provide analgesia. Date: 21 (visit 21)  21 (visit 22)  21 (visit 23)  21 (visit 24)  21 (visit 25)    Modalities:                Therapeutic Exercise: 42 min 45 min 38 min 42 min 40 min    UBE: 3'/3' L4  Face pull: 2x10 blue  Overhead lunges: x2 laps with 24# bar   's walk: 2x2 laps B with 24# bar then 1 lap pressing overhead   Behind back swing: 3x5 B with 24# bar  Cable ER: 10x10#, 10x12.5#, 10x15# B  Cable IR: [id number]# B UBE: 3'/3' L4  Face pull: 2x10 blue  Bear crawl: 1 lap forward and backwards, 1 lap with elbows bent   Sideways crawl: 1 lap both directions  Sideways crawl hoppin lap both directions  Side plank with shoulder abduction: x5 B  Behind back swing: x10 both hands, 2x10 single arm B  Cable ER: [id number]# B  Cable IR: [id number]# B UBE: 3'/3' L4  Face pull: 2x10 blue  Ball throw into wall: 3x6   Barbell overhead carry: x2 laps with 65#  Barbell rack carry: x2 with 65#  Shoulder carry with barbell: x2 laps with 65# B. Pt reported some sensitivity on L. Doorway stretch: x1 hold   Yoke carry: x1 lap with 65#  Cable ER: x10x15# B Face pull: 2x10 black  Band pull-apart: 2x10 black band  Ball throw into wall: 3x6   Shoulder raises with 9# bar: x10 B anterior and lateral each  Shoulder carry with barbell: x2 laps with 65# B. Pt reported this felt better than last time. Stick swing behind head with 9# bar: x10 both directions with 2 hands, x10 both directions with 1 hand  Cable ER: x10x15#, 10x10# B Face pull: 2x10 blue  Band pull-apart: 2x10 red band  Band ER: 2x10 red   Ball throw into wall: 3x6   Shoulder raises with 9# bar: x10 B anterior and lateral each  Shoulder carry with barbell: x2 laps with 65# B.    Stick swing behind head with 9# bar: x10 both directions with 2 hands, x10 both directions with 1 hand  Cable ER: x10x15#, 10x10# B   Neuromuscular re-education                Manual Therapy:                Therapeutic Activities:                  Home program: 9/30/20: Band ER, pull-apart, and D2 flexion  10/13/20: thoracic bridge  11/19/20: add median nerve mobilization    [facility] Portal  Treatment/Session Summary:  · Response to Treatment: Pt has made significant progress regarding L shoulder pain, strength, and functional use. He reports no limitations in ADLs and work tasks. He is being discharged at this time. · Communication/Consultation:  None today  · Equipment provided today: Blue band  · Recommendations: D/C to home program    Total Treatment Billable Duration:  40 minutes  PT Patient Time In/Time Out  Time In: 1620  Time Out: [address], PT, DPT    No future appointments.